# Patient Record
Sex: FEMALE | Race: OTHER | HISPANIC OR LATINO | URBAN - METROPOLITAN AREA
[De-identification: names, ages, dates, MRNs, and addresses within clinical notes are randomized per-mention and may not be internally consistent; named-entity substitution may affect disease eponyms.]

---

## 2023-08-25 ENCOUNTER — TELEPHONE (OUTPATIENT)
Dept: NEUROLOGY | Facility: CLINIC | Age: 68
End: 2023-08-25
Payer: MEDICARE

## 2023-08-25 NOTE — TELEPHONE ENCOUNTER
Called pt's son back (he is listed on chart in phone comments and his number is on chart under contacts)    Spoke to pt's son  I let him know we need her records before scheduling    He said he will contact her outside provider and get everything sent to us or will hand deliver. I told him to feel free to call us and follow up and make sure we've received the records and then we can get her scheduled  He verbalized understanding and had no further concerns or questions.

## 2023-09-12 ENCOUNTER — TELEPHONE (OUTPATIENT)
Dept: NEUROLOGY | Facility: CLINIC | Age: 68
End: 2023-09-12
Payer: MEDICARE

## 2023-09-12 NOTE — TELEPHONE ENCOUNTER
----- Message from Jessie Garcia sent at 9/12/2023 12:18 PM CDT -----  Regarding: appt access  Contact: Zhou [phone]  Pt son Zhou  is calling to speak with someone in provider office  in regards to getting pt scheduled with the provider pt stated he spoke with the [facility] this morning to check status of medical records that should have been sent over for pt Zhou is asking for a return call please call him at  [phone]

## 2023-09-12 NOTE — TELEPHONE ENCOUNTER
I received a fax from [facility] w/ pt records     I will have Dr Gatica review, then will send to be scanned to medical record

## 2023-09-13 NOTE — TELEPHONE ENCOUNTER
Called him back to ask per Dr Gatica if pt has had brain MRI past 6 mo bc we will need disc if so  He said no, not since last summer  I scheduled appt  Pt's sonverbalized understanding and had no further concerns or questions.

## 2023-09-21 ENCOUNTER — HOSPITAL ENCOUNTER (OUTPATIENT)
Dept: CARDIOLOGY | Facility: CLINIC | Age: 68
Discharge: HOME | End: 2023-09-21
Payer: MEDICARE

## 2023-09-21 ENCOUNTER — LAB VISIT (OUTPATIENT)
Dept: LAB | Facility: HOSPITAL | Age: 68
End: 2023-09-21
Attending: PSYCHIATRY & NEUROLOGY
Payer: MEDICARE

## 2023-09-21 ENCOUNTER — OFFICE VISIT (OUTPATIENT)
Dept: NEUROLOGY | Facility: CLINIC | Age: 68
End: 2023-09-21
Payer: MEDICARE

## 2023-09-21 VITALS
WEIGHT: 114.63 LBS | BODY MASS INDEX: 19.57 KG/M2 | HEART RATE: 78 BPM | HEIGHT: 64 IN | DIASTOLIC BLOOD PRESSURE: 82 MMHG | SYSTOLIC BLOOD PRESSURE: 135 MMHG

## 2023-09-21 DIAGNOSIS — G31.84 MCI (MILD COGNITIVE IMPAIRMENT): ICD-10-CM

## 2023-09-21 DIAGNOSIS — R41.3 OTHER AMNESIA: ICD-10-CM

## 2023-09-21 DIAGNOSIS — E46 PROTEIN-CALORIE MALNUTRITION, UNSPECIFIED SEVERITY: ICD-10-CM

## 2023-09-21 DIAGNOSIS — G31.84 MCI (MILD COGNITIVE IMPAIRMENT): Primary | ICD-10-CM

## 2023-09-21 LAB
ALBUMIN SERPL BCP-MCNC: 4.1 G/DL (ref 3.5–5.2)
ALP SERPL-CCNC: 62 U/L (ref 55–135)
ALT SERPL W/O P-5'-P-CCNC: 14 U/L (ref 10–44)
ANION GAP SERPL CALC-SCNC: 9 MMOL/L (ref 8–16)
AST SERPL-CCNC: 20 U/L (ref 10–40)
BASOPHILS # BLD AUTO: 0.09 K/UL (ref 0–0.2)
BASOPHILS NFR BLD: 1 % (ref 0–1.9)
BILIRUB SERPL-MCNC: 0.5 MG/DL (ref 0.1–1)
BUN SERPL-MCNC: 13 MG/DL (ref 8–23)
CALCIUM SERPL-MCNC: 9.7 MG/DL (ref 8.7–10.5)
CHLORIDE SERPL-SCNC: 103 MMOL/L (ref 95–110)
CO2 SERPL-SCNC: 26 MMOL/L (ref 23–29)
CREAT SERPL-MCNC: 0.7 MG/DL (ref 0.5–1.4)
DIFFERENTIAL METHOD: ABNORMAL
EOSINOPHIL # BLD AUTO: 0.1 K/UL (ref 0–0.5)
EOSINOPHIL NFR BLD: 1.4 % (ref 0–8)
ERYTHROCYTE [DISTWIDTH] IN BLOOD BY AUTOMATED COUNT: 13.3 % (ref 11.5–14.5)
EST. GFR  (NO RACE VARIABLE): >60 ML/MIN/1.73 M^2
FOLATE SERPL-MCNC: 9.8 NG/ML (ref 4–24)
GLUCOSE SERPL-MCNC: 82 MG/DL (ref 70–110)
HCT VFR BLD AUTO: 45.9 % (ref 37–48.5)
HGB BLD-MCNC: 14.7 G/DL (ref 12–16)
IGG SERPL-MCNC: 884 MG/DL (ref 650–1600)
IMM GRANULOCYTES # BLD AUTO: 0.03 K/UL (ref 0–0.04)
IMM GRANULOCYTES NFR BLD AUTO: 0.3 % (ref 0–0.5)
LYMPHOCYTES # BLD AUTO: 2.2 K/UL (ref 1–4.8)
LYMPHOCYTES NFR BLD: 23.5 % (ref 18–48)
MAGNESIUM SERPL-MCNC: 2.1 MG/DL (ref 1.6–2.6)
MCH RBC QN AUTO: 31.1 PG (ref 27–31)
MCHC RBC AUTO-ENTMCNC: 32 G/DL (ref 32–36)
MCV RBC AUTO: 97 FL (ref 82–98)
MONOCYTES # BLD AUTO: 0.7 K/UL (ref 0.3–1)
MONOCYTES NFR BLD: 7.3 % (ref 4–15)
NEUTROPHILS # BLD AUTO: 6.2 K/UL (ref 1.8–7.7)
NEUTROPHILS NFR BLD: 66.5 % (ref 38–73)
NRBC BLD-RTO: 0 /100 WBC
PLATELET # BLD AUTO: 396 K/UL (ref 150–450)
PMV BLD AUTO: 9.2 FL (ref 9.2–12.9)
POTASSIUM SERPL-SCNC: 3.8 MMOL/L (ref 3.5–5.1)
PROT SERPL-MCNC: 7.4 G/DL (ref 6–8.4)
RBC # BLD AUTO: 4.72 M/UL (ref 4–5.4)
SODIUM SERPL-SCNC: 138 MMOL/L (ref 136–145)
T4 SERPL-MCNC: 5.2 UG/DL (ref 4.5–11.5)
TSH SERPL DL<=0.005 MIU/L-ACNC: 2.17 UIU/ML (ref 0.4–4)
WBC # BLD AUTO: 9.36 K/UL (ref 3.9–12.7)

## 2023-09-21 PROCEDURE — 93005 EKG 12-LEAD: ICD-10-PCS | Mod: S$GLB,,, | Performed by: PSYCHIATRY & NEUROLOGY

## 2023-09-21 PROCEDURE — 0361U NEURFLMNT LT CHN DIG IA QUAN: CPT | Performed by: PSYCHIATRY & NEUROLOGY

## 2023-09-21 PROCEDURE — 3079F PR MOST RECENT DIASTOLIC BLOOD PRESSURE 80-89 MM HG: ICD-10-PCS | Mod: CPTII,S$GLB,, | Performed by: PSYCHIATRY & NEUROLOGY

## 2023-09-21 PROCEDURE — 1160F RVW MEDS BY RX/DR IN RCRD: CPT | Mod: CPTII,S$GLB,, | Performed by: PSYCHIATRY & NEUROLOGY

## 2023-09-21 PROCEDURE — 96132 NRPSYC TST EVAL PHYS/QHP 1ST: CPT | Mod: 59,S$GLB,, | Performed by: PSYCHIATRY & NEUROLOGY

## 2023-09-21 PROCEDURE — 82746 ASSAY OF FOLIC ACID SERUM: CPT | Performed by: PSYCHIATRY & NEUROLOGY

## 2023-09-21 PROCEDURE — 1126F AMNT PAIN NOTED NONE PRSNT: CPT | Mod: CPTII,S$GLB,, | Performed by: PSYCHIATRY & NEUROLOGY

## 2023-09-21 PROCEDURE — 93005 ELECTROCARDIOGRAM TRACING: CPT | Mod: S$GLB,,, | Performed by: PSYCHIATRY & NEUROLOGY

## 2023-09-21 PROCEDURE — 1101F PR PT FALLS ASSESS DOC 0-1 FALLS W/OUT INJ PAST YR: ICD-10-PCS | Mod: CPTII,S$GLB,, | Performed by: PSYCHIATRY & NEUROLOGY

## 2023-09-21 PROCEDURE — 93010 EKG 12-LEAD: ICD-10-PCS | Mod: S$GLB,,, | Performed by: INTERNAL MEDICINE

## 2023-09-21 PROCEDURE — 3075F PR MOST RECENT SYSTOLIC BLOOD PRESS GE 130-139MM HG: ICD-10-PCS | Mod: CPTII,S$GLB,, | Performed by: PSYCHIATRY & NEUROLOGY

## 2023-09-21 PROCEDURE — 83520 IMMUNOASSAY QUANT NOS NONAB: CPT | Performed by: PSYCHIATRY & NEUROLOGY

## 2023-09-21 PROCEDURE — 3008F BODY MASS INDEX DOCD: CPT | Mod: CPTII,S$GLB,, | Performed by: PSYCHIATRY & NEUROLOGY

## 2023-09-21 PROCEDURE — 1126F PR PAIN SEVERITY QUANTIFIED, NO PAIN PRESENT: ICD-10-PCS | Mod: CPTII,S$GLB,, | Performed by: PSYCHIATRY & NEUROLOGY

## 2023-09-21 PROCEDURE — 82607 VITAMIN B-12: CPT | Performed by: PSYCHIATRY & NEUROLOGY

## 2023-09-21 PROCEDURE — 96116 NUBHVL XM PHYS/QHP 1ST HR: CPT | Mod: 59,S$GLB,, | Performed by: PSYCHIATRY & NEUROLOGY

## 2023-09-21 PROCEDURE — 1101F PT FALLS ASSESS-DOCD LE1/YR: CPT | Mod: CPTII,S$GLB,, | Performed by: PSYCHIATRY & NEUROLOGY

## 2023-09-21 PROCEDURE — 3008F PR BODY MASS INDEX (BMI) DOCUMENTED: ICD-10-PCS | Mod: CPTII,S$GLB,, | Performed by: PSYCHIATRY & NEUROLOGY

## 2023-09-21 PROCEDURE — 3075F SYST BP GE 130 - 139MM HG: CPT | Mod: CPTII,S$GLB,, | Performed by: PSYCHIATRY & NEUROLOGY

## 2023-09-21 PROCEDURE — 84425 ASSAY OF VITAMIN B-1: CPT | Performed by: PSYCHIATRY & NEUROLOGY

## 2023-09-21 PROCEDURE — 84436 ASSAY OF TOTAL THYROXINE: CPT | Performed by: PSYCHIATRY & NEUROLOGY

## 2023-09-21 PROCEDURE — 3288F PR FALLS RISK ASSESSMENT DOCUMENTED: ICD-10-PCS | Mod: CPTII,S$GLB,, | Performed by: PSYCHIATRY & NEUROLOGY

## 2023-09-21 PROCEDURE — 93010 ELECTROCARDIOGRAM REPORT: CPT | Mod: S$GLB,,, | Performed by: INTERNAL MEDICINE

## 2023-09-21 PROCEDURE — 83735 ASSAY OF MAGNESIUM: CPT | Performed by: PSYCHIATRY & NEUROLOGY

## 2023-09-21 PROCEDURE — 86780 TREPONEMA PALLIDUM: CPT | Performed by: PSYCHIATRY & NEUROLOGY

## 2023-09-21 PROCEDURE — 83921 ORGANIC ACID SINGLE QUANT: CPT | Mod: 91 | Performed by: PSYCHIATRY & NEUROLOGY

## 2023-09-21 PROCEDURE — 1159F PR MEDICATION LIST DOCUMENTED IN MEDICAL RECORD: ICD-10-PCS | Mod: CPTII,S$GLB,, | Performed by: PSYCHIATRY & NEUROLOGY

## 2023-09-21 PROCEDURE — 96132 PR NEUROPSYCHOLOGIC TEST EVAL SVCS, 1ST HR: ICD-10-PCS | Mod: 59,S$GLB,, | Performed by: PSYCHIATRY & NEUROLOGY

## 2023-09-21 PROCEDURE — 0346U AD DETECT, BETA-AMYLOID 42/40 RATIO: CPT | Performed by: PSYCHIATRY & NEUROLOGY

## 2023-09-21 PROCEDURE — 1159F MED LIST DOCD IN RCRD: CPT | Mod: CPTII,S$GLB,, | Performed by: PSYCHIATRY & NEUROLOGY

## 2023-09-21 PROCEDURE — 99205 PR OFFICE/OUTPT VISIT, NEW, LEVL V, 60-74 MIN: ICD-10-PCS | Mod: S$GLB,,, | Performed by: PSYCHIATRY & NEUROLOGY

## 2023-09-21 PROCEDURE — 99205 OFFICE O/P NEW HI 60 MIN: CPT | Mod: S$GLB,,, | Performed by: PSYCHIATRY & NEUROLOGY

## 2023-09-21 PROCEDURE — 99999 PR PBB SHADOW E&M-EST. PATIENT-LVL II: CPT | Mod: PBBFAC,,, | Performed by: PSYCHIATRY & NEUROLOGY

## 2023-09-21 PROCEDURE — 3079F DIAST BP 80-89 MM HG: CPT | Mod: CPTII,S$GLB,, | Performed by: PSYCHIATRY & NEUROLOGY

## 2023-09-21 PROCEDURE — 82784 ASSAY IGA/IGD/IGG/IGM EACH: CPT | Performed by: PSYCHIATRY & NEUROLOGY

## 2023-09-21 PROCEDURE — 3288F FALL RISK ASSESSMENT DOCD: CPT | Mod: CPTII,S$GLB,, | Performed by: PSYCHIATRY & NEUROLOGY

## 2023-09-21 PROCEDURE — 85025 COMPLETE CBC W/AUTO DIFF WBC: CPT | Performed by: PSYCHIATRY & NEUROLOGY

## 2023-09-21 PROCEDURE — 1160F PR REVIEW ALL MEDS BY PRESCRIBER/CLIN PHARMACIST DOCUMENTED: ICD-10-PCS | Mod: CPTII,S$GLB,, | Performed by: PSYCHIATRY & NEUROLOGY

## 2023-09-21 PROCEDURE — 99999 PR PBB SHADOW E&M-EST. PATIENT-LVL II: ICD-10-PCS | Mod: PBBFAC,,, | Performed by: PSYCHIATRY & NEUROLOGY

## 2023-09-21 PROCEDURE — 96116 PR NEUROBEHAVIORAL STATUS EXAM BY PSYCH/PHYS: ICD-10-PCS | Mod: 59,S$GLB,, | Performed by: PSYCHIATRY & NEUROLOGY

## 2023-09-21 PROCEDURE — 80053 COMPREHEN METABOLIC PANEL: CPT | Performed by: PSYCHIATRY & NEUROLOGY

## 2023-09-21 PROCEDURE — 84443 ASSAY THYROID STIM HORMONE: CPT | Performed by: PSYCHIATRY & NEUROLOGY

## 2023-09-21 NOTE — PROGRESS NOTES
[facility]  [facility]     PATIENT: Abbey Hendrickson  VISIT DATE: 2023  MRN: [id number]  PRIMARY PROVIDER: Radha Primary Doctor  : 1955       Chief complaint: Progressive Cognitive Impairment     History of present illness:      The patient is a 68-year-old right-handed female who presents today to the [facility] due to concerns related to Progressive Cognitive Impairment.  The patient is accompanied by the son who participates in providing history.  Additional information is obtained by reviewing available medical records.     Relevant Background/Context  Known Relevant Family history:  Mother -  at age 80  Father - N/A  Neurocognitive Disorder:  Mother - LOAD onset early 70s  80s maybe VAD  Movement Disorder:  Maternal great aunt - always in crutches  Motorneuron Disorder:  The patient/family denies a history of ALS, MND, PLS.  Developmental Disorder:  The patient/family denies a history of Dyslexia, ADHD, ASD.  Psychiatric Disorder:  The patient/family denies a history of MDD, BD, SHAI, Schizophrenia.  Known Relevant Genetics:  There is no relevant genetic biomarkers available on record.  Developmental Milestones:  The patient/family report no known birth complications or early life problems. The patient met all developmental milestones.  Education/Learning Capacity:  The patient/family report no signs or symptoms suggestive of developmental learning disorder.  HS  BA. + 4 years [employer]/Psychology  Estimated Educational Experience: 12 years of formal education.  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 Born in the [place], the patient experienced numerous relocations during her early childhood, eventually settling in [place] from the age of 14. She denies any relevant childhood medical or traumatic history or exposure to toxins/malnutrition. The patient maintained a normal state of health until 2019, when her family first noticed an escalation in her baseline anxiety. It is unclear if a psychiatric management plan was established at this time, and Lexapro is the only known psychiatric medication she has historically been on. The patient cannot recall whether she experienced any clinical benefits from Lexapro. In 2019, her family reported a rise in anxiety levels and noted worsening short-term memory loss and forgetfulness, prompting a neurological evaluation in [place], with an MRI of the brain being completed. However, the records from these encounters are not available for secondary review, and no further workup was conducted. Due to escalating concerns regarding cognitive impairment, the patient was further evaluated at the [facility], and a neuropsychologist reportedly diagnosed her with Mild Cognitive Impairment (MCI) in early 2020. Again, these records are not available for secondary review. Over the subsequent two years, concerns grew regarding the patients isolation and increased alcohol intake, coupled with a loss in weight and deteriorating nutrition. The patient also experienced increasing depression and anxiety, reportedly from a deteriorating relationship, and was reportedly told she had progressed to dementia in July 2022 by a neuropsychologist. Consequently, she moved to [place] to live with her son in November 2022, and since the relocation, her family reports relative stability in her  symptoms, with acute amnesia being the notable exception. 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 Since then, the patient is no longer responsible for handling large sums of money, taxes, or finances but remains fully independent in terms of chores, meal preparation, shopping, and handling small amounts of money. Upon presentation, the patient is described as pleasant and appropriate, with no cortical deficits noted, but displaying severe acute amnesia with significant lesion. This clinical presentation raises concerns for conditions such as Wernicke's Korsakoff's, and late-onset Alzheimer's disease cannot be ruled out, suggesting a likely mixed pathology given the reported clinical history. It is recommended to undertake additional diagnostic workups to definitively rule out conditions such as Alzheimer's disease and to screen for reversible causes of cognitive impairment. The family has requested a more comprehensive evaluation due to previously limited diagnoses by other providers.  Unresolved Concern(s) reported by patient/family:  PREV lexapro  PREV donepezil, namenda     Review of cognitive, visuospatial, motor, sensory, and behavioral systems:     Memory:   The patient's memory has worsened in the past few years.  She does repeat statements or asks the same question repeatedly.  She does have difficulty remembering recent important conversations.  She does have difficulty remembering recent events. Comment: needs prompting  She does not forget information within minutes.  Her recent retrograde memory is intact.  Her remote memory is intact.  Attention:   The patient's attention and concentration are impaired.  She does not have attentional fluctuations.  She does have difficulty with selective attention.  She does become easily distracted.  She does have difficulty  with divided attention.  Executive:   The patient's cognitive processing speed is slower.  She does have difficulty with working memory.  She does misplace personal items (e.g., keys, cell phone, wallet) more frequently.  She does have difficulty keeping track of her medications.  She does have difficulty with planning/organizing/completing multistep tasks.  She does have not difficulty with executive attention.  She does have difficulty with flexible thinking.  She does not have difficulty with response inhibition.  She denies new impulsivity or rash/careless actions.  Her judgment is intact.  Language:   The patient's speech is affected.  She does forget people's names more frequently.  She does not have word-finding difficulties.  Her speech is fluent and non-effortful.  Her speech is grammatically intact.  She does not make word substitutions.  She does not have difficulty reading.  She does not appear to have impaired comprehension.  Visuospatial:   The patient has new visuospatial problems.  She has become confused or disoriented in *new*, unfamiliar places.  She does have trouble navigating.  She has gotten lost in familiar places.  She does not have visuospatial disorientation.  She does not have difficulty recognizing objects or faces.  She denies problems with driving or parking.  Motor/Coordination:   The patient does have difficulty with walking.  She does not feel imbalanced.  She denies having fallen.  She does not appear to have new muscle weakness.  She does not have difficulty buttoning shirts, operating zippers, or manipulating tools/utensils.  Her handwriting has not become micrographic.  She does not have a resting tremor.  She denies having any new involuntary movements and/or muscle jerking.  She does not have swallowing difficulty.  She denies new muscle cramps and twitching.  Sensory:   The patient reports a new sensory disturbance described as numbness, tingling, paresthesias, and/or  pain.  The patient denies a loss of vision, blurry vision, or double vision.  The patient denies new loss of hearing or worsening tinnitus.  The patient denies anosmia.  Sleep:   The patient reports difficulty sleeping.  The patient does have difficulty going to sleep.  The patient denies difficulty staying asleep or frequently awakening at night.  The patient does snore and/or have witnessed apneas while sleeping. Comment: very mild snoring  When she wakes up in the morning, she does feel well-rested.  She denies dream-enactment behavior.  She denies symptoms suggestive of restless leg syndrome.  Behavior:   The patient's personality has not changed.  She does not have symptoms of disinhibition and social inappropriateness.  She does not have symptoms to suggest a loss of manners or decorum.  She does not have apathy and/or decreased motivation.  She does not appear to have a change in inertia.  The patient's emotional expression has changed.  She does have emotional blunting or lability.  She does not have symptoms of irritability and mood lability.  She does not have symptoms of agitation, aggression, or violent outbursts.  Her insight into his disease and situation is impaired.  Her personal hygiene has become impaired.  She is not exhibiting a diminished response to other people's needs and feelings.  She is not exhibiting a diminished social interest, interrelatedness, or personal warmth.  She denies restlessness.  She denies new and/or worsening simple repetitive behaviors.  Her speech has not become simplified or become repetitive/stereotyped.  She reports symptoms that are suggestive of new/worsening complex repetitive/ritualistic compulsions and behaviors.  She does not have symptoms of hyper-religiosity or dogmatism.  Her interests/pleasures have not become restrictive, simplified, interrupting, or repetitive.  She denies a change of self-stimulating behavior.  She has had changes in eating behavior.  She  denies increased consumption of food or substances.  She denies oral exploration or consumption of inedible objects.  Psychiatric:   She does not feel depressed.  She is exhibiting symptoms of social withdrawal/indifference.  She does have anxiety.  She does not exhibit cycling behavior.  She does not exhibit hyperactive behavior.  She is not exhibited symptoms of paranoia.  She does not have delusions.  She does not have hallucinations.  She does not have a history of sensitivity to neuroleptic/psychotropic medications.  Medical Review of Systems:   The patient does not have constipation.  The patient does not have urinary incontinence.  The patient denies orthostatic lightheadedness.  The patient's weight is stable.  Functional status:  Difficulty performing the following Instrumental ADLs:  Housekeeping: No  Food Preparation: No  Shopping: No  Ability to Handle Finances: Yes  Transportation/Driving: No  Household Appliances/Stove: No  Laundry: No  Difficulty performing the following Basic ADLs:  Dressing: No  Bathing: No  Toileting: No  Personal hygiene and grooming: No  Feeding: No  Care Management:  Patient/Family Safety Concerns:  Medication Adherence: No  Home Safety: No  Wandered: No  Firearms: No  Fall Risk: No  Home Alone: No            History reviewed. No pertinent past medical history.    History reviewed. No pertinent surgical history.    History reviewed. No pertinent family history.    Social History     Socioeconomic History    Marital status: Unknown       Medication:     No current outpatient medications on file prior to visit.     No current facility-administered medications on file prior to visit.        Review of patient's allergies indicates:  No Known Allergies    Medications Reconciliation:   I have reconciled the patient's home medications and discharge medications with the patient/family. I have updated all changes.  Refer to After-Visit Medication List.    Objective:  Vital  Signs:  Vitals:    09/21/23 1045   BP: 135/82   Pulse: 78     Wt Readings from Last 3 Encounters:   09/21/23 1045 52 kg (114 lb 10.2 oz)     Body mass index is 19.68 kg/m².           Neurological examination:  Mental Status:   Her appearance deviates from typical expectations given their age and context. Comment: looks older than stated age;  Throughout the interview, she is cooperative, her eye contact is appropriate.  Her behavior is appropriate to the clinical context without impropriety or improper language/conduct.  Her behavior was not characterized by episodes of sudden uncontrollable and inappropriate laughing or crying.  The patient is awake; Her energy level appeared normal.  Her orientation is normal; Spatial 5/5 (location, the floor of building, city, county, state) and temporal 5/5 (month, day, year, MOJGAN) dimensions are accurate.  She has appropriate attention/concentration (DARYA/MOJGAN forwards and backward).  She can complete three-step commands.  Her fund of knowledge was appropriate for age, culture, and level of education.  Her thought process is not logical or goal-oriented. Comment: circumstantial;  She demonstrated impaired insight based on actions, awareness of her illness, plans for the future.  She demonstrated good judgment based on actions and plans for the future.  She has no evidence of hallucinations (auditory, visual, olfactory).  She has no evidence of delusions (paranoid, grandiose, bizarre).  Cranial Nerves:   Her pupils were normal.  Her visual fields were full to confrontation in all quadrants.  Her ocular pursuit in the horizontal and vertical plane was complete.  Her saccadic initiation, velocity, and amplitude are normal.  Her facial strength was normal.  Her facial expression was symmetric and appropriate to the context.  Her hearing was diminished.  Her tongue showed no evidence of scalloping.  She tongue movement with normal.  She had no significant evidence of anterocollis or  retrocollis.  Speech/Language:   The patient's speech was fluent, non-effortful, and her rate was appropriate to the context.  She has no articulation (segmental features) errors.  The patient's speech is not dysarthric.  The patient's speech was without evidence of anomia.  She makes no phonological loop errors.  She can comprehend commands that cross the midline (e.g., with your left thumb, touch your right ear).  She can comprehend syntactically complex sentences.  Motor:   The patient's bilateral upper extremity muscle bulk is appropriate.  The patient's upper extremity muscle tone is increased.  There is evidence of rigidity/cogwheeling. Comment: B/L, R>L, Mild; Muscle tone is increased and there is evidence of rigidity/cogwheeling.  Assessment of motor strength was symmetric and at minimal anti-gravity.  There is no pronator or downward drift.  There is no myoclonus observed in The patient's bilateral upper and lower extremities.  There are no fasciculations observed in The patient's bilateral upper and lower extremities.  Coordination:   She has no bilateral upper extremity limb dysmetria or past pointing on finger-nose-finger bilaterally.  She has no limb dysdiadochokinesia of the upper extremity on the pronation/supination test and screwing in a light bulb or lower extremity during tapping ball of each foot bilaterally.  She has no visible tremor.  She has no evidence of interhemispheric motor control deficits.  She has no motor overflow bilaterally.  The patient's upper extremity fine motor coordination was abnormal. Comment: B/L, R>L, Mild;  The patient's upper extremity fine motor coordination was not slow. Comment: finger tapping, pronation/supination, and the open-close fist was slow.; finger tapping, pronation/supination, and the open-close fist was slow.  The patient's upper extremity fine motor coordination was not hypometric. Comment: finger tapping, pronation/supination, and the open-close fist  showed hypometria.; finger tapping, pronation/supination, and the open-close fist showed hypometria.  The patient's upper extremity fine motor coordination was not dysrhythmic. Comment: finger tapping, pronation/supination, and the open-close fist showed dysrhythmia.; finger tapping, pronation/supination, and the open-close fist showed dysrhythmia.  Higher Cortical Function:   The patient showed no evidence of simultanagnosia (Navon hierarchical letters).  The patient showed no evidence of visuospatial constructional dysfunction.  The patient showed no evidence of apraxia.  She showed no dysexecutive behavior.  Sensory:   Her cortical sensory assessment demonstrated no neglect bilaterally.  Her sensation was intact to light touch, and vibratory sense in the bilateral upper and lower extremities.  Reflexes:   Reflexes were symmetric and 2+ at biceps, 2+ triceps, and 2+ brachioradialis, 2+ at the knees bilaterally, there was no cross-abductor sign, 2+ in the bilateral ankles.  Gait:   She can arise from a chair and sit back down without using their arms.  Her gait was normal.  When attempting to walk abnormally (heels, tiptoes, tandem), she makes no errors.  She has no evidence of a specific gait disorder.  Neuropsychological Evaluation Summary:  Prior Neurocognitive/Neurobehavioral Evaluation(s)  No Prior Testing Available  Neurocognitive/Neurobehavioral Evaluation completed on 2023-09-21    Memory    Registration-3 2/3 Impairment: Moderate.   Recall-3 0/3 Impairment: Significant.   Recall-5 0/5 Impairment: Significant.   Registration-5 2/0     T1 2/9 Impairment: -3 STDs below the average score based on age and education.   T2 5/9 Impairment: -3 STDs below the average score based on age and education.   T3 4/9 Impairment: -4.8 STDs below the average score based on age and education.   T4 6/9 Impairment: -3.1 STDs below the average score based on age and education.   T5 5/9 Impairment: -4.4 STDs below the average  score based on age and education.   DR-30 Sec 2/9 Impairment: -4.4 STDs below the average score based on age and education.   DR-Cued 2/9 Impairment: -5.1 STDs below the average score based on age and education.   Recognition 9/9 Within Normal Limits.   Executive    Three-step command 3/3 Within Normal Limits.   Trials-1 1/1 Within Normal Limits.   WORLD Backward 5/5 Within Normal Limits.   Digit Span - 2 2/2 Within Normal Limits.   Serial Sevens 2/3 Impairment: Moderate.   Fluency 1/1 Within Normal Limits.   Digit Span Backwards 5 Within Normal Limits.   Lexical Fluency - F 16 Within Normal Limits.   Lexical Fluency - A 15 Within Normal Limits.   Lexical Fluency - S 17 Within Normal Limits.   Semantic Fluency - Animals 20 Within Normal Limits.   Visuospatial    Intersecting Pentagons 1/1 Within Normal Limits.   3D Cube Copy 0/1 Impairment: Significant.   Clock Draw 3/3 Within Normal Limits.   Barkley Copy 14/17 Impairment: Mild to Normal.   Overlapping Images - Update 12/12 Within Normal Limits.   Picture Synthesis 3/3 Within Normal Limits.   Emotion Perception 5/5 Within Normal Limits.   Calculations 5/5 Within Normal Limits.   Noise Pareidolia Test 5/5 Within Normal Limits.   Attention    Orientation-10 5/10 Impairment: Moderate.   Orientation-6 3/6 Impairment: Moderate.   Alternating Sequence 1/1 Within Normal Limits.   Digit Span Forwards 7 Within Normal Limits.   Language    Repetition-1 1/1 Within Normal Limits.   Naming-2 2/2 Within Normal Limits.   Following written command 1/1 Within Normal Limits.   Writing a complete sentence 1/1 Within Normal Limits.   Naming-3 3/3 Within Normal Limits.   Repetition-2 2/2 Within Normal Limits.   Abstraction 2/2 Within Normal Limits.   15-Item BNT 15/15 Within Normal Limits.   Repetition of Phrases 5/5 Within Normal Limits.   Verbal Agility 6/6 Within Normal Limits.   SYDBAT - Semantic Association 30/30 Within Normal Limits.   Repeat & Point - Nonfluent 10/10 Within  Normal Limits.   Repeat & Point - Semantics 10/10 Within Normal Limits.   Neurocognitive Focused Evaluation Aggregate Score(s)    MMSE 21/30 MMSE Score suggestive of mild cognitive impairment.   MOCA 20/30 MOCA Score suggestive of mild cognitive impairment.   Neuropsychiatric/Behavioral Focused Evaluation Assessment    BEHAV5+ 2/6 See ROS section for a full description           Neuroimaging:  No brain imaging is currently available for review.     Procedures:  No behavioral neurology relevant procedures are currently available for review.     Clinical Summary:     The patient is a 68-year-old right-handed female without a relevant past medical history who presents reporting a 4-year history of gradually progressive neurocognitive impairment.       The clinical history is suggestive of:  Memory Impairment: STM encoding impairment, LTM encoding-retrieval impairment  Attention Impairment: Attention, Selective attention, Sustained attention, Shifting attention  Executive Impairment: Energization, Working Memory  Language Impairment: Language Dysfunction  Visuospatial Impairment: Allocentric Spatial Processing  Motor/Coordination Impairment: Sensory motor integration  Behavior Impairment: Neurovegetative, Emotional Regulation, Self-Preservation Dysregulation, Sensorimotor Dysregulation, Stimulation Dysregulation  Psychiatric Impairment: Social Coherence, Signal-Noise Dysregulation  iADL Impairment: [place] Instrumental Activities of Daily Living Scale  The neurological examination is significant for:  Executive Impairment: thought disorder  Movement Disorder (Hypokinetic): parkinsonism (tone, bradykinesia), dyskinesia (slowing, hypometria, dysrhythmia)  Sensory Dysfunction: hearing (diminished)  The neurocognitive battery is significant (based on age and education) for:  Amnestic predominant mild cognitive impairment  Severe  Memory Impairment: encoding, recall, attention.  MMSE 21/30: MMSE Score suggestive of mild  cognitive impairment.  MOCA 20/30: MOCA Score suggestive of mild cognitive impairment.  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     The patient's clinical presentation meets the criteria for Mild Cognitive Impairment (MCI-ADRC) (Juan Carlos MS, et al. 2011 Alzheimer's & Dementia).     Concern regarding an intraindividual change in cognition  Impairment in one or more cognitive domains  Preservation of independence in functional abilities.  Not demented     The patient's clinical presentation technically meets criteria for early stages of Late-Onset Alzheimer's Dementia (LOAD) (Lyndsey et al. 2011)  Meets criteria for dementia.  Insidious onset over months to years.  Clear-cut history of worsening cognition by report or observation.  Amnestic presentation: impairment in learning and recall.        However, given the severity of focal amnesia with relative preservation of other domains without any cortical findings on examination suggests there may be some degree of and Wernicke-Korsakoff Syndrome Spectrum (WKS).  At present, all neurodegenerative diseases can only be diagnosed with 100% certainty through a brain autopsy. The suspected neuropathology underlying the patient's neurocognitive impairment is likely a mixture of pathologies (Alzheimer's Disease Related Pathology, Vascular Contributions to Cognitive Impairment and Dementia, a metabolic/vitamin deficiency).  There are no plasma protein biomarkers available on record.  There are no CSF protein biomarkers available on record.  There are no dermatological protein biomarkers available on  record.  There is no relevant genetic biomarkers available on record.       Patient presents reporting a at minimum 4 year history of gradual progressive neurocognitive impairment with borderline interference in instrumental activities of daily living consistent prodrome of dementia.  Clinical presentation is consistent with severe largely focal amnestic cognitive impairment with preservation of all the all the all other domains without any focal cortical signs collectively concerning for Wernicke's Korsakoff's syndrome or LATE.  Recommend ruling out Alzheimer's disease.     The observations made above, were discussed with the patient and their supporting historian(s) (son). We have discussed the additional diagnostic(s) and/or managenent below.     Care Management Plan:    #Diagnostic Screening for reversible forms of neurocognitive disorders  We recommend screening for reversible causes of neurocognitive impairment with plasma laboratories  We have ordered plasma CBC, CMP, Vitamins (B1, B9, B12), Mg, RPR, MMA, TSH, T4, Nfl  We recommend screening for anatomical CNS lesions/neurodegenerative patterns  We have ordered an MRI brain without contrast - Dementia Protocol  #Diagnostic Screening for cardiac conduction impairment before initiation of acetylcholinesterase inhibitor medication.  We have placed an order for a echocardiogram  #Diagnostic Screening for measurable forms of neurodegenerative pathology.  We have discussed opportunities for biomarker testing ([facility] biomarkers, [facility]As Amyloid-PET, Syn-One skin biopsy).  #Optimize Neurocognitive Impairment and Quality  We have discussed the MIND Diet and other lifestyle behavior that may help maintain brain health.  We have provided written/digital reading material  #Optimize Behavioral Management and Quality.  No indication for memantine at this time  #Optimize Sleep Hygiene and Quality  We discussed and recommended additional diagnostic/management of sleep  "disorder to optimize brain health and longevity.  We have placed referrals to sleep medicine due to signs and symptoms suggestive of sleep apnea.  #Behavioral/Environmental Strategies  We recommend engaging in activities that stimulate cognitively and socially while avoiding excessive stimulation and fatigue in overwhelmingly complex situations.  We recommend integrating routine and schedule into your daily life. https://www.alzheimersproject.org/news/the-importance-of-routine-and-familiarity-to-persons-with-dementia/  #Health Maintenance/Lifestyle Advice  We have discussed the value in aggressively controlling vascular risk factors like hypertension, hyperlipidemia, and Diabetes SBP<130, LDL<100, A1C<7.0.  We discussed the need to optimize lifestyle choices including a heart-healthy diet (e.g., Mediterranean or DASH), increased cardiovascular exercise (goal 150 minutes of moderate-intensity per week), and stay cognitively and socially active.  We recommend the MIND diet, a combination of two healthy diets: the Mediterranean diet and the DASH (Dietary Approaches to Stop Hypertension) diet, and includes a variety of brain-friendly foods to optimize cognitive health and longevity.  #Support  We all need support sometimes. Get easy access to local resources, community programs, and services. https://www.communityresourcefinder.org/  Learn more about Cognitive Impairment in [place]: https://www.alz.org/professionals/public-health/state-overview[place]  #Safety  The [employer] administers the nationwide "Safe Return" program with identification bracelets, necklaces, or clothing tags and 24-hour assistance. More information is available online at https://www.alz.org/help-support/caregiving/safety/medicalert-with-24-7-wandering-support  #Follow up:  Follow-up in 4 weeks (Oct 2023).    Thank you for allowing us to participate in the care of your patient. Please do not hesitate to contact us with any " questions or concerns.     It was a pleasure seeing The patient and we look forward to seeing them at their follow-up visit.     This note is dictated on M*Modal Fluency Direct word recognition program. There are word recognition mistakes that are occasionally missed on review.         Scheduled Follow-up :  Future Appointments   Date Time Provider Department Center   9/21/2023 12:30 PM LAB, APPOINTMENT [facility]       After Visit Medication List :     Medication List      as of September 21, 2023 11:46 AM     You have not been prescribed any medications.         Signing Physician:  Daniel Gatica MD    Billing:        -----------------------------------------------------------------------------    I spent a total of 90 minutes (time-in: 11:00 AM; time-out: 12:30 PM) on 2023-09-21, in person face-to-face with the patient and caregiver(s), >50% of that time was spent counseling regarding the symptoms, treatment plan, risks, therapeutic options, lifestyle modifications, and/or safety issues for the diagnoses above.    10/14 Review of Systems completed and is negative except as stated above in HPI (Systems reviewed: Const, Eyes, ENT, Resp, CV, GI, , MSK, Skin, Neuro)    I performed a neurobehavioral status examination that included a clinical assessment of thinking, reasoning, and judgment. Please see above HPI and ROS for full details. This exam was performed on 2023-09-21 and included 14 minutes spent on direct face-to-face clinical observation and interview with the patient and 18 minutes spent interpreting test results and preparing the report. The total time of 32 minutes spent on the neurobehavioral status examination is not included in the time spent on evaluation and management coding.    I performed a neuropsychological evaluation that included the application of a series of standardized neurocognitive tests. Please see the informal neuropsychological assessment above for full  details. This evaluation was performed on 2023-09-21 and included 13 minutes spent on direct face-to-face clinical standardized test administration with the patient and 19 minutes spent on interpreting standardized test results, integrating patient data into a treatment plan, and providing feedback to the patient and caregiver. The total time of 32 minutes spent on the neuropsychological evaluation is not included in the time spent on evaluation and management coding.    Total Billing time spent on encounter/documentation for this patient's evaluation and management, not including the neurobehavioral status examination and neuropsychological evaluation: 63 minutes.

## 2023-09-22 LAB — VIT B12 SERPL-MCNC: 280 NG/L (ref 180–914)

## 2023-09-23 ENCOUNTER — HOSPITAL ENCOUNTER (OUTPATIENT)
Dept: RADIOLOGY | Facility: OTHER | Age: 68
Discharge: HOME | End: 2023-09-23
Attending: PSYCHIATRY & NEUROLOGY
Payer: MEDICARE

## 2023-09-23 DIAGNOSIS — R41.3 OTHER AMNESIA: ICD-10-CM

## 2023-09-23 PROCEDURE — 70551 MRI BRAIN STEM W/O DYE: CPT | Mod: 26,,, | Performed by: RADIOLOGY

## 2023-09-23 PROCEDURE — 70551 MRI BRAIN STEM W/O DYE: CPT | Mod: TC

## 2023-09-23 PROCEDURE — 70551 MRI BRAIN WITHOUT CONTRAST: ICD-10-PCS | Mod: 26,,, | Performed by: RADIOLOGY

## 2023-09-25 LAB — NEUROFILAMENT LIGHT CHAIN, PLASMA: 16.2 PG/ML

## 2023-09-26 ENCOUNTER — TELEPHONE (OUTPATIENT)
Dept: NEUROLOGY | Facility: CLINIC | Age: 68
End: 2023-09-26
Payer: MEDICARE

## 2023-09-26 LAB
METHYLMALONATE SERPL-SCNC: 0.19 NMOL/ML
METHYLMALONATE SERPL-SCNC: 0.21 UMOL/L
PHOSPHO-TAU (181P): 0.78 PG/ML (ref 0–0.97)
TREPONEMA PALLIDUM IGG+IGM AB [PRESENCE] IN SERUM OR PLASMA BY IMMUNOASSAY: NONREACTIVE

## 2023-09-26 NOTE — TELEPHONE ENCOUNTER
----- Message from Daniel Gatica MD sent at 9/24/2023  8:43 PM CDT -----  Hi,  Please schedule the patient for a video follow-up to discuss test results and care management.  Daniel Ordonez

## 2023-09-27 LAB
ABETA 42/40 RATIO: <0.227
AL BETA40 PLAS-MCNC: 110 PG/ML
AL BETA42 PLAS-MCNC: <25 PG/ML
VIT B1 BLD-MCNC: 69 UG/L (ref 38–122)

## 2023-11-14 ENCOUNTER — OFFICE VISIT (OUTPATIENT)
Dept: NEUROLOGY | Facility: CLINIC | Age: 68
End: 2023-11-14
Payer: MEDICARE

## 2023-11-14 DIAGNOSIS — E51.9 THIAMINE DEFICIENCY: ICD-10-CM

## 2023-11-14 DIAGNOSIS — E53.8 B12 DEFICIENCY: ICD-10-CM

## 2023-11-14 DIAGNOSIS — F04 WERNICKE-KORSAKOFF SYNDROME: ICD-10-CM

## 2023-11-14 DIAGNOSIS — G31.9 BRAIN ATROPHY: ICD-10-CM

## 2023-11-14 DIAGNOSIS — G31.84 MCI (MILD COGNITIVE IMPAIRMENT): Primary | ICD-10-CM

## 2023-11-14 DIAGNOSIS — F10.10 ALCOHOL ABUSE: ICD-10-CM

## 2023-11-14 PROCEDURE — 99215 OFFICE O/P EST HI 40 MIN: CPT | Mod: 25,95,, | Performed by: PSYCHIATRY & NEUROLOGY

## 2023-11-14 PROCEDURE — 1159F MED LIST DOCD IN RCRD: CPT | Mod: CPTII,95,, | Performed by: PSYCHIATRY & NEUROLOGY

## 2023-11-14 PROCEDURE — 96116 PR NEUROBEHAVIORAL STATUS EXAM BY PSYCH/PHYS: ICD-10-PCS | Mod: 59,95,, | Performed by: PSYCHIATRY & NEUROLOGY

## 2023-11-14 PROCEDURE — 1159F PR MEDICATION LIST DOCUMENTED IN MEDICAL RECORD: ICD-10-PCS | Mod: CPTII,95,, | Performed by: PSYCHIATRY & NEUROLOGY

## 2023-11-14 PROCEDURE — 1160F RVW MEDS BY RX/DR IN RCRD: CPT | Mod: CPTII,95,, | Performed by: PSYCHIATRY & NEUROLOGY

## 2023-11-14 PROCEDURE — 1160F PR REVIEW ALL MEDS BY PRESCRIBER/CLIN PHARMACIST DOCUMENTED: ICD-10-PCS | Mod: CPTII,95,, | Performed by: PSYCHIATRY & NEUROLOGY

## 2023-11-14 PROCEDURE — 96116 NUBHVL XM PHYS/QHP 1ST HR: CPT | Mod: 59,95,, | Performed by: PSYCHIATRY & NEUROLOGY

## 2023-11-14 PROCEDURE — 99215 PR OFFICE/OUTPT VISIT, EST, LEVL V, 40-54 MIN: ICD-10-PCS | Mod: 25,95,, | Performed by: PSYCHIATRY & NEUROLOGY

## 2023-11-14 RX ORDER — ACETAMINOPHEN, DIPHENHYDRAMINE HCL, PHENYLEPHRINE HCL 325; 25; 5 MG/1; MG/1; MG/1
TABLET ORAL
Qty: 30 TABLET | Refills: 3 | Status: SHIPPED | OUTPATIENT
Start: 2023-11-14

## 2023-11-14 RX ORDER — THIAMINE HCL 250 MG
250 TABLET ORAL DAILY
Qty: 30 TABLET | Refills: 5 | Status: SHIPPED | OUTPATIENT
Start: 2023-11-14

## 2023-11-14 NOTE — PROGRESS NOTES
[facility]     PATIENT: Abbey Hendrickson  VISIT DATE: 2023  MRN: [id number]  PRIMARY PROVIDER: Radha Primary Doctor  : 1955       Chief complaint: Progressive Cognitive Impairment     History of present illness:      The patient is a 68-year-old right-handed female who presents today to the [facility] due to concerns related to Progressive Cognitive Impairment.  The patient is accompanied by the son who participates in providing history.  Additional information is obtained by reviewing available medical records.     Relevant Background/Context  Known Relevant Family history:  Mother -  at age 80  Father - N/A  Neurocognitive Disorder:  Mother - LOAD onset early 70s  80s maybe VAD  Movement Disorder:  Maternal great aunt - always in crutches  Motorneuron Disorder:  The patient/family denies a history of ALS, MND, PLS.  Developmental Disorder:  The patient/family denies a history of Dyslexia, ADHD, ASD.  Psychiatric Disorder:  The patient/family denies a history of MDD, BD, SHAI, Schizophrenia.  Known Relevant Genetics:  There is no relevant genetic biomarkers available on record.  Developmental Milestones:  The patient/family report no known birth complications or early life problems. The patient met all developmental milestones.  Education/Learning Capacity:  The patient/family report no signs or symptoms suggestive of developmental learning disorder.  HS  BA. + 4 years [employer]/Psychology  Estimated Educational Experience: 12 years of formal education.  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  Neurologist (09/21/2023)  Type: Chart Review. The patient is presented with her son, who serves as the primary historian, and additional history is assembled from a review of previous medical records. Born in the [place], the patient experienced numerous relocations during her early childhood, eventually settling in [place] from the age of 14. She denies any relevant childhood medical or traumatic history or exposure to toxins/malnutrition. The patient maintained a normal state of health until 2019, when her family first noticed an escalation in her baseline anxiety. It is unclear if a psychiatric management plan was established at this time, and Lexapro is the only known psychiatric medication she has historically been on. The patient cannot recall whether she experienced any clinical benefits from Lexapro. In 2019, her family reported a rise in anxiety levels and noted worsening short-term memory loss and forgetfulness, prompting a neurological evaluation in [place], with an MRI of the brain being completed. However, the records from these encounters are not available for secondary review, and no further workup was conducted. Due to escalating concerns regarding cognitive impairment, the patient was further evaluated at the [facility], and a neuropsychologist reportedly diagnosed her with Mild Cognitive Impairment (MCI) in early 2020. Again, these records are not available for secondary review. Over the subsequent two years, concerns grew regarding the patient's isolation and increased alcohol intake, coupled with a loss in weight and deteriorating nutrition. The patient also experienced increasing depression and anxiety, reportedly from a deteriorating relationship, and was reportedly told she had progressed to dementia in July 2022 by a neuropsychologist. Consequently, she moved to [place] to live with her son in November 2022, and since the relocation, her family  reports relative stability in her symptoms, with acute amnesia being the notable exception. 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 Since then, the patient is no longer responsible for handling large sums of money, taxes, or finances but remains fully independent in terms of chores, meal preparation, shopping, and handling small amounts of money. Upon presentation, the patient is described as pleasant and appropriate, with no cortical deficits noted, but displaying severe acute amnesia with significant lesion. This clinical presentation raises concerns for conditions such as Wernicke's Korsakoff's, and late-onset Alzheimer's disease cannot be ruled out, suggesting a likely mixed pathology given the reported clinical history. It is recommended to undertake additional diagnostic workups to definitively rule out conditions such as Alzheimer's disease and to screen for reversible causes of cognitive impairment. The family has requested a more comprehensive evaluation due to previously limited diagnoses by other providers.     Current Presentation  Recent/Interim History:  The recent serum laboratory results do not align with Alzheimer's Disease Related Pathology (ADRP). Magnetic Resonance Imaging (MRI) of the brain indicates hippocampal atrophy, with cortical preservation, and mild hyperintensities in the anterior mamillary body and anterior thalamus, raising concerns for Wernicke-Korsakoff syndrome (WKS). The diagnosis of Wernicke-Korsakoff syndrome was discussed, noting that the patient's unclear history of malnutrition and alcohol use between 2019 and 2022 likely caused subacute metabolic brain damage, as evidenced by MRI findings in the anterior thalamic/mammillary body regions. This damage is consistent with the patient's focal amnesia upon presentation. Serum biomarkers do  not support an Alzheimer's disease-related process, despite pronounced bilateral hippocampal volume loss. The absence of elevated lics445 and serum abeta42/40 levels argues against this etiology. Additionally, there is mild, asymmetric anterior temporal volume loss, predominantly on the right, not entirely attributable to alcohol abuse. This could indicate alcohol-related cortical volume loss or a [id number] anterior temporal degeneration. Unfortunately, there are no biomarkers available to confirm this diagnosis currently. Management recommendations include focusing on modifiable risk factors. The patient continues excessive alcohol consumption; thus, abstinence is strongly advised. We recommend initiating high-dose thiamine and vitamin B12 supplementation, coupled with dietary management involving the patient's her family. The value of cognitive rehabilitation through speech therapy was discussed and the patient expressed interest. However, the patient is currently not considering substance abuse programs. We will provide relevant reading materials via our social work team.  Unresolved Concern(s) reported by patient/family:  PREV lexapro  PREV donepezil, namenda     Review of cognitive, visuospatial, motor, sensory, and behavioral systems:     Memory:   The patient's memory has worsened in the past few years.  She does repeat statements or asks the same question repeatedly.  She does have difficulty remembering recent important conversations.  She does have difficulty remembering recent events. Comment: needs prompting  She does not forget information within minutes.  Her recent retrograde memory is intact.  Her remote memory is intact.  Attention:   The patient's attention and concentration are impaired.  She does not have attentional fluctuations.  She does have difficulty with selective attention.  She does become easily distracted.  She does have difficulty with divided attention.  Executive:   The  patient's cognitive processing speed is slower.  She does have difficulty with working memory.  She does misplace personal items (e.g., keys, cell phone, wallet) more frequently.  She does have difficulty keeping track of her medications.  She does have difficulty with planning/organizing/completing multistep tasks.  She does have not difficulty with executive attention.  She does have difficulty with flexible thinking.  She does not have difficulty with response inhibition.  She denies new impulsivity or rash/careless actions.  Her judgment is intact.  Language:   The patient's speech is affected.  She does forget people's names more frequently.  She does not have word-finding difficulties.  Her speech is fluent and non-effortful.  Her speech is grammatically intact.  She does not make word substitutions.  She does not have difficulty reading.  She does not appear to have impaired comprehension.  Visuospatial:   The patient has new visuospatial problems.  She has become confused or disoriented in *new*, unfamiliar places.  She does have trouble navigating.  She has gotten lost in familiar places.  She does not have visuospatial disorientation.  She does not have difficulty recognizing objects or faces.  She denies problems with driving or parking.  Motor/Coordination:   The patient does have difficulty with walking.  She does not feel imbalanced.  She denies having fallen.  She does not appear to have new muscle weakness.  She does not have difficulty buttoning shirts, operating zippers, or manipulating tools/utensils.  Her handwriting has not become micrographic.  She does not have a resting tremor.  She denies having any new involuntary movements and/or muscle jerking.  She does not have swallowing difficulty.  She denies new muscle cramps and twitching.  Sensory:   The patient reports a new sensory disturbance described as numbness, tingling, paresthesias, and/or pain.  The patient denies a loss of vision,  blurry vision, or double vision.  The patient denies new loss of hearing or worsening tinnitus.  The patient denies anosmia.  Sleep:   The patient reports difficulty sleeping.  The patient does have difficulty going to sleep.  The patient denies difficulty staying asleep or frequently awakening at night.  The patient does snore and/or have witnessed apneas while sleeping. Comment: very mild snoring  When she wakes up in the morning, she does feel well-rested.  She denies dream-enactment behavior.  She denies symptoms suggestive of restless leg syndrome.  Behavior:   The patient's personality has not changed.  She does not have symptoms of disinhibition and social inappropriateness.  She does not have symptoms to suggest a loss of manners or decorum.  She does not have apathy and/or decreased motivation.  She does not appear to have a change in inertia.  The patient's emotional expression has changed.  She does have emotional blunting or lability.  She does not have symptoms of irritability and mood lability.  She does not have symptoms of agitation, aggression, or violent outbursts.  Her insight into his disease and situation is impaired.  Her personal hygiene has become impaired.  She is not exhibiting a diminished response to other people's needs and feelings.  She is not exhibiting a diminished social interest, interrelatedness, or personal warmth.  She denies restlessness.  She denies new and/or worsening simple repetitive behaviors.  Her speech has not become simplified or become repetitive/stereotyped.  She reports symptoms that are suggestive of new/worsening complex repetitive/ritualistic compulsions and behaviors.  She does not have symptoms of hyper-religiosity or dogmatism.  Her interests/pleasures have not become restrictive, simplified, interrupting, or repetitive.  She denies a change of self-stimulating behavior.  She has had changes in eating behavior.  She denies increased consumption of food or  substances.  She denies oral exploration or consumption of inedible objects.  Psychiatric:   She does not feel depressed.  She is exhibiting symptoms of social withdrawal/indifference.  She does have anxiety.  She does not exhibit cycling behavior.  She does not exhibit hyperactive behavior.  She is not exhibited symptoms of paranoia.  She does not have delusions.  She does not have hallucinations.  She does not have a history of sensitivity to neuroleptic/psychotropic medications.  Medical Review of Systems:   The patient does not have constipation.  The patient does not have urinary incontinence.  The patient denies orthostatic lightheadedness.  The patient's weight is stable.  Functional status:  Difficulty performing the following Instrumental ADLs:  Housekeeping: No  Food Preparation: No  Shopping: No  Ability to Handle Finances: Yes  Transportation/Driving: No  Household Appliances/Stove: No  Laundry: No  Difficulty performing the following Basic ADLs:  Dressing: No  Bathing: No  Toileting: No  Personal hygiene and grooming: No  Feeding: No  Care Management:  Patient/Family Safety Concerns:  Medication Adherence: No  Home Safety: No  Wandered: No  Firearms: No  Fall Risk: No  Home Alone: No          No past medical history on file.    No past surgical history on file.    No family history on file.    Social History     Socioeconomic History    Marital status: Unknown       Medication:     No current outpatient medications on file prior to visit.     No current facility-administered medications on file prior to visit.        Review of patient's allergies indicates:  No Known Allergies    Medications Reconciliation:   I have reconciled the patient's home medications and discharge medications with the patient/family. I have updated all changes.  Refer to After-Visit Medication List.    Objective:  Vital Signs:  There were no vitals filed for this visit.  Wt Readings from Last 3 Encounters:   09/21/23 1045 52 kg  (114 lb 10.2 oz)     There is no height or weight on file to calculate BMI.           Neurological examination:  Mental Status:   Her appearance deviates from typical expectations given their age and context. Comment: looks older than stated age;  Throughout the interview, she is cooperative, her eye contact is appropriate.  Her behavior is appropriate to the clinical context without impropriety or improper language/conduct.  Her behavior was not characterized by episodes of sudden uncontrollable and inappropriate laughing or crying.  The patient is awake; Her energy level appeared normal.  Her orientation is normal; Spatial 5/5 (location, the floor of building, city, county, state) and temporal 5/5 (month, day, year, MOJGAN) dimensions are accurate.  She has appropriate attention/concentration (DARYA/MOJGAN forwards and backward).  She can complete three-step commands.  Her fund of knowledge was appropriate for age, culture, and level of education.  Her thought process is not logical or goal-oriented. Comment: circumstantial;  She demonstrated impaired insight based on actions, awareness of her illness, plans for the future.  She demonstrated good judgment based on actions and plans for the future.  She has no evidence of hallucinations (auditory, visual, olfactory).  She has no evidence of delusions (paranoid, grandiose, bizarre).  Cranial Nerves:   Her pupils were normal.  Her visual fields were full to confrontation in all quadrants.  Her ocular pursuit in the horizontal and vertical plane was complete.  Her saccadic initiation, velocity, and amplitude are normal.  Her facial strength was normal.  Her facial expression was symmetric and appropriate to the context.  Her hearing was diminished.  Her tongue showed no evidence of scalloping.  She tongue movement with normal.  She had no significant evidence of anterocollis or retrocollis.  Speech/Language:   The patient's speech was fluent, non-effortful, and her rate was  appropriate to the context.  She has no articulation (segmental features) errors.  The patient's speech is not dysarthric.  The patient's speech was without evidence of anomia.  She makes no phonological loop errors.  She can comprehend commands that cross the midline (e.g., with your left thumb, touch your right ear).  She can comprehend syntactically complex sentences.  Motor:   The patient's bilateral upper extremity muscle bulk is appropriate.  The patient's upper extremity muscle tone is increased.  There is evidence of rigidity/cogwheeling. Comment: B/L, R>L, Mild; Muscle tone is increased and there is evidence of rigidity/cogwheeling.  Assessment of motor strength was symmetric and at minimal anti-gravity.  There is no pronator or downward drift.  There is no myoclonus observed in The patient's bilateral upper and lower extremities.  There are no fasciculations observed in The patient's bilateral upper and lower extremities.  Coordination:   She has no bilateral upper extremity limb dysmetria or past pointing on finger-nose-finger bilaterally.  She has no limb dysdiadochokinesia of the upper extremity on the pronation/supination test and screwing in a light bulb or lower extremity during tapping ball of each foot bilaterally.  She has no visible tremor.  She has no evidence of interhemispheric motor control deficits.  She has no motor overflow bilaterally.  The patient's upper extremity fine motor coordination was abnormal. Comment: B/L, R>L, Mild;  The patient's upper extremity fine motor coordination was not slow. Comment: finger tapping, pronation/supination, and the open-close fist was slow.; finger tapping, pronation/supination, and the open-close fist was slow.  The patient's upper extremity fine motor coordination was not hypometric. Comment: finger tapping, pronation/supination, and the open-close fist showed hypometria.; finger tapping, pronation/supination, and the open-close fist showed  hypometria.  The patient's upper extremity fine motor coordination was not dysrhythmic. Comment: finger tapping, pronation/supination, and the open-close fist showed dysrhythmia.; finger tapping, pronation/supination, and the open-close fist showed dysrhythmia.  Higher Cortical Function:   The patient showed no evidence of simultanagnosia (Navon hierarchical letters).  The patient showed no evidence of visuospatial constructional dysfunction.  The patient showed no evidence of apraxia.  She showed no dysexecutive behavior.  Sensory:   Her cortical sensory assessment demonstrated no neglect bilaterally.  Her sensation was intact to light touch, and vibratory sense in the bilateral upper and lower extremities.  Reflexes:   Reflexes were symmetric and 2+ at biceps, 2+ triceps, and 2+ brachioradialis, 2+ at the knees bilaterally, there was no cross-abductor sign, 2+ in the bilateral ankles.  Gait:   She can arise from a chair and sit back down without using their arms.  Her gait was normal.  When attempting to walk abnormally (heels, tiptoes, tandem), she makes no errors.  She has no evidence of a specific gait disorder.  Neuropsychological Evaluation Summary:  Prior Neurocognitive/Neurobehavioral Evaluation(s)  No Prior Testing Available  2023-09-21:  Amnestic predominant mild cognitive impairment  Severe  Memory Impairment: encoding, recall, attention.  MMSE 21/30: MMSE Score suggestive of mild cognitive impairment.  MOCA 20/30: MOCA Score suggestive of mild cognitive impairment.  Neurocognitive/Neurobehavioral Evaluation completed on 2023-11-14    Neuropsychiatric/Behavioral Focused Evaluation Assessment    BEHAV5+ 2/6 See ROS section for a full description   Laboratories:     Lab Date Value [Reference]   Neurodegenerative Cerebrospinal Fluid Assessment           Abeta42 2023, Sep-21    <25 [pg/mL]      Phospho-Tau (181P) 2023, Sep-21    0.78 [0.00 - 0.97 pg/mL]      Cerebrospinal Fluid Assessment   IgG 09/21/2023   884 [650 - 1600 mg/dL]      Neuroendocrine/Electrolyte Screening   Magnesium 2023, Sep-21    2.1 [1.6 - 2.6 mg/dL]      BUN 2023, Sep-21    13 [8 - 23 mg/dL]      Chloride 2023, Sep-21    103 [95 - 110 mmol/L]      Creatinine 2023, Sep-21    0.7 [0.5 - 1.4 mg/dL]      Potassium 2023, Sep-21    3.8 [3.5 - 5.1 mmol/L]      Sodium 09/21/2023  138 [136 - 145 mmol/L]      Metabolic Screening   Methlymalonic Acid 09/21/2023  0.21 [<0.40 umol/L]      T4 Total 09/21/2023  5.2 [4.5 - 11.5 ug/dL]      TSH 2023, Sep-21    2.172 [0.400 - 4.000 uIU/mL]      Glucose 2023, Sep-21    82 [70 - 110 mg/dL]      Albumin 2023, Sep-21    4.1 [3.5 - 5.2 g/dL]      ALT 2023, Sep-21    14 [10 - 44 U/L]      AST 2023, Sep-21    20 [10 - 40 U/L]      BILIRUBIN TOTAL 2023, Sep-21    0.5 [0.1 - 1.0 mg/dL]      PROTEIN TOTAL 09/21/2023  7.4 [6.0 - 8.4 g/dL]      B12 Def. Methylmalonic Acid 09/21/2023  0.19 [<=0.40 nmol/mL]      Folate 09/21/2023  9.8 [4.0 - 24.0 ng/mL]      Thiamine 09/21/2023  69 [38 - 122 ug/L]      Vitamin B-12 2023, Sep-21    280 [180 - 914 ng/L]      Neurodegenerative Serum Fluid Assessment   NEUROFILAMENT LIGHT CHAIN, PLASMA 2023, Sep-21    16.2 [<=28.0 pg/mL]      Standard Hematology Screen   Hematocrit 2023, Sep-21    45.9 [37.0 - 48.5 %]      Hemoglobin 2023, Sep-21    14.7 [12.0 - 16.0 g/dL]      MCV 09/21/2023  97 [82 - 98 fL]      Infectious Disease/Immunocompromised Screening   Syphilis Treponemal Ab 2023, Sep-21    Nonreactive [Nonreactive]           Neuroimaging:    MRI brain/head without contrast on 9/23/2023  Formal interpretation by Radiology:  Age advanced medial temporal lobe volume loss clinical correlation advised. Slight prominence lateral and 3rd ventricles somewhat disproportionate to the degree of volume loss with normal pressure hydrocephalus not excluded. Clinical correlation advised No evidence for acute infarction or significant parenchymal edema signal abnormality.  Independently reviewed  radiological imaging by Daniel Wei MD. MPH. Behavioral Neurologist  T1: No significant cortical atrophy with age-appropriate changes. Mild to moderate subcortical nuclei and hippocampi atrophy bilateral.  T2/FLAIR: mild anterior periventricular capping with gliosis of bilateral hippocampi. mild anterior mamillary body/anterior thalamic hyperintensities  DWI/ADC: No Significant DWI hyperintensities/hypointensities. No ADC correlation.  SWI/GRE: No Significant hypointensities to suggest cortical/subcortical hemosiderin deposition.  Impression: : Moderate degree of hippocampi atrophy bilateral with relative preservation of cortex and mild anterior mamillary body/anterior thalamic hyperintensities consistent with WKS.     Procedures:    Electrocardiogram on 9/21/2023  Formal interpretation:  Vent. Rate : 066 BPM Atrial Rate : 066 BPM P-R Int : 182 ms QRS Dur : 096 ms QT Int : 396 ms P-R-T Axes : 076 -18 050 degrees QTc Int : 415 ms Normal sinus rhythm Incomplete right bundle branch block Borderline Abnormal ECG  Independently reviewed Electrocardiogram by Daniel Wei MD. MPH. Behavioral Neurologist  Impression: : Received ECG has no evidence of sinus node disease. HR (>=50-60). Prolonged MI interval (>0.22 s). Broad QRS complex (> 0.12 s).     Clinical Summary:     The patient is a 68-year-old right-handed female without a relevant past medical history who presents reporting a 4-year history of gradually progressive neurocognitive impairment.       The clinical history is suggestive of:  Memory Impairment: STM encoding impairment, LTM encoding-retrieval impairment  Attention Impairment: Attention, Selective attention, Sustained attention, Shifting attention  Executive Impairment: Energization, Working Memory  Language Impairment: Language Dysfunction  Visuospatial Impairment: Allocentric Spatial Processing  Motor/Coordination Impairment: Sensory motor integration  Behavior Impairment: Neurovegetative,  Emotional Regulation, Self-Preservation Dysregulation, Sensorimotor Dysregulation, Stimulation Dysregulation  Psychiatric Impairment: Social Coherence, Signal-Noise Dysregulation  iADL Impairment: [place] Instrumental Activities of Daily Living Scale  The neurological examination is significant for:  Executive Impairment: thought disorder  Movement Disorder (Hypokinetic): parkinsonism (tone, bradykinesia), dyskinesia (slowing, hypometria, dysrhythmia)  Sensory Dysfunction: hearing (diminished)  The neurocognitive battery is significant (based on age and education) for:  Amnestic predominant mild cognitive impairment     BEHAV5+ 2/6: See ROS section for a full description  The neurologically relevant imaging is significant for  MRI brain/head without contrast (9/23/2023): Moderate degree of hippocampi atrophy bilateral with relative preservation of cortex and mild anterior mamillary body/anterior thalamic hyperintensities consistent with WKS.        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     The patient's clinical presentation meets the criteria for Mild Cognitive Impairment (MCI-ADRC) (Juan Carlos MS, et al. 2011 Alzheimer's & Dementia).     Concern regarding an intraindividual change in cognition  Impairment in one or more cognitive domains  Preservation of independence in functional abilities.  Not demented     The patient's clinical presentation technically meets criteria for early stages of Late-Onset Alzheimer's Dementia (LOAD) (McCarolyneann et al. 2011)  Meets criteria for dementia.  Insidious onset over months to years.  Clear-cut history of worsening cognition by report or observation.  Amnestic presentation: impairment in learning and recall.        However, given the severity of focal amnesia  with relative preservation of other domains without any cortical findings on examination suggests there may be some degree of and Wernicke-Korsakoff Syndrome Spectrum (WKS).  At present, all neurodegenerative diseases can only be diagnosed with 100% certainty through a brain autopsy. The suspected neuropathology underlying the patient's neurocognitive impairment is likely a mixture of pathologies (Alzheimer's Disease Related Pathology, Vascular Contributions to Cognitive Impairment and Dementia, a metabolic/vitamin deficiency).  Plasma Protein Biomarkers: [09/21/2023] Neurofilament Light Chain (Nfl): 16.2.  Cerebrospinal Fluid Protein Biomarkers: [09/21/2023] Abeta42: 0 (L). Phospho-Tau (181P): 0.78.  There are no dermatological protein biomarkers available on record.  There is no relevant genetic biomarkers available on record.     he patient presents with a four-year history of gradual, progressive amnestic single-domain cognitive impairment, which marginally affects instrumental activities of daily living. Magnetic Resonance Imaging (MRI) of the brain reveals mild-to-moderate bilateral hippocampal volume loss, with a possible greater loss on the right than on the left in the anterior temporal regions. Serum biomarkers do not align with an Alzheimer's disease-related process, and there are no other apparent indications of frontotemporal degeneration. The limited history available from 2019 to 2022 suggests concerns of malnutrition coupled with chronic alcohol abuse. Collectively, the presentation aligns most closely with Wernicke-Korsakoff syndrome, as evidenced by the history of alcohol use, focal amnesia, and regional T2 hyperintensities in the mamillary bodies and anterior thalamic nuclei. There is, however, some mild asymmetrical volume loss in the anterior temporal region, more pronounced on the right side. It remains uncertain whether this is related to alcohol use or is indicative of a secondary  degenerative process. We will continue to monitor this aspect closely.     The observations made above, were discussed with the patient and their supporting historian(s) (son). We have discussed the additional diagnostic(s) and/or managenent below.     Care Management Plan:    #Diagnostic Screening for measurable forms of neurodegenerative pathology.  We have discussed opportunities for biomarker testing ([facility] [place] biomarkers, IDEAs Amyloid-PET, Syn-One skin biopsy) - do not strongly recommended at this time  #Optimize Neurocognitive Impairment and Quality  We have discussed the MIND Diet and other lifestyle behavior that may help maintain brain health.  We have provided written/digital reading material  Start high dose thiamine 250 mg q.day next start B12 5000 mcg q.day for 1 month and continue once per week thereafter  We have referred to Speech therapy for cognitive rehabilitation.  unlikely patient received meaningful clinical benefit from donepezil  #Optimize Behavioral Management and Quality.  No indication for memantine at this time  #Optimize Sleep Hygiene and Quality  We discussed and recommended additional diagnostic/management of sleep disorder to optimize brain health and longevity.  follow-up referrals to sleep medicine placed at last appointment  #Optimize Cerebrovascular Health.  The patient has a documented history of hyperlipidemia and/or hypercholesteremia with long-term complications such as cerebrovascular disease, peripheral vascular disease, and/or aortic atherosclerosis. Collectively these risk factors may contribute to cerebral atherosclerosis, and cerebral hypoperfusion compounded neurocognitive disorder. We discussed maximizing cerebrovascular-related medical therapy, including but not limited to cholesterol medications and antiplatelet agents. We have discussed the value of aggressively controlling vascular risk factors like hypertension, hyperlipidemia, and Diabetes SBP<130, LDL<100, and  A1C<7.0. We discussed the need to optimize lifestyle choices, including a heart-healthy diet (e.g., Mediterranean or DASH), increased cardiovascular exercise (goal 150 minutes of moderate-intensity per week), and staying cognitively and socially active.  We recommend switching patients from ongoing lipophilic statin therapy to hydrophilic statin therapy (Bradley et al., 2015; William et al., 2018; Lindsay et al., 2021, Wyatt et al., 2021).  Following fasting lipid profile results, we will consider adding pravastatin or rosuvastatin.  Continue aspirin 81 mg  #Behavioral/Environmental Strategies  We recommend engaging in activities that stimulate cognitively and socially while avoiding excessive stimulation and fatigue in overwhelmingly complex situations.  We recommend integrating routine and schedule into your daily life. https://www.alzheimersproject.org/news/the-importance-of-routine-and-familiarity-to-persons-with-dementia/  #Health Maintenance/Lifestyle Advice  We have discussed the value in aggressively controlling vascular risk factors like hypertension, hyperlipidemia, and Diabetes SBP<130, LDL<100, A1C<7.0.  We discussed the need to optimize lifestyle choices including a heart-healthy diet (e.g., Mediterranean or DASH), increased cardiovascular exercise (goal 150 minutes of moderate-intensity per week), and stay cognitively and socially active.  We recommend the MIND diet, a combination of two healthy diets: the Mediterranean diet and the DASH (Dietary Approaches to Stop Hypertension) diet, and includes a variety of brain-friendly foods to optimize cognitive health and longevity.  #Support  We all need support sometimes. Get easy access to local resources, community programs, and services. https://www.communityresourcefinder.org/  Learn more about Cognitive Impairment in [place]: https://www.alz.org/professionals/public-health/state-overview/[place]  #Safety  The Alzheimer's Association administers the  "nationwide "Safe Return" program with identification bracelets, necklaces, or clothing tags and 24-hour assistance. More information is available online at https://www.alz.org/help-support/caregiving/safety/medicalert-with-24-7-wandering-support  #Follow up:  Follow-up as needed.    Thank you for allowing us to participate in the care of your patient. Please do not hesitate to contact us with any questions or concerns.     It was a pleasure seeing The patient and we look forward to seeing them at their follow-up visit.     This note is dictated on M*Modal Fluency Direct word recognition program. There are word recognition mistakes that are occasionally missed on review.         Scheduled Follow-up :  No future appointments.    After Visit Medication List :     Medication List            Accurate as of November 14, 2023  3:58 PM. If you have any questions, ask your nurse or doctor.                START taking these medications      cyanocobalamin (vitamin B-12) 5,000 mcg Subl  Place one under tongue once a day for 1 month, then continue to take under tongue per week  Started by: Daniel Gatica MD     thiamine 250 MG tablet  Take 1 tablet (250 mg total) by mouth once daily.  Started by: Daniel Gatica MD               Where to Get Your Medications        These medications were sent to [facility]/pharmacy #[id number] [place] [id number] [facility]  [address] [facility] [address]      Phone: [phone]   cyanocobalamin (vitamin B-12) 5,000 mcg Subl  thiamine 250 MG tablet         Signing Physician:  Daniel Gatica MD    Billing:        -----------------------------------------------------------------------------    I performed this consultation using real-time Telehealth tools, including a live video connection between my location and the patient's location ([facility] home [facility]). Prior to initiating the consultation, I obtained informed verbal consent to perform this consultation using Telehealth " tools and answered all the questions about the Telehealth interaction. The participants understand that only a limited neurological exam and limited neuropsychological testing can be performed using Telehealth tools.    I spent a total of 45 minutes (time-in: 15:15 PM; time-out: 16:00 PM) on 2023-11-14, virtually face-to-face with the patient and caregiver(s), >50% of that time was spent counseling regarding the symptoms, treatment plan, risks, therapeutic options, lifestyle modifications, and/or safety issues for the diagnoses above.    10/14 Review of Systems completed and is negative except as stated above in HPI (Systems reviewed: Const, Eyes, ENT, Resp, CV, GI, , MSK, Skin, Neuro)    I reviewed previous labs for a total of 5 minutes on 2023-11-14. This is directly related to the face-to-face encounter. Review of previous labs was performed all negative except as stated above in HPI    I reviewed previous diagnostic testing for a total of 5 minutes on 2023-11-14. This is directly related to the face-to-face encounter. A review of previous diagnostic testing was performed was noted to be within normal limits except as is stated above in HPI    I independently reviewed radiological imaging, specimen, and tracing for a total of 15 minutes on 2023-11-14. This is directly related to the face-to-face encounter. Independent review of radiological imaging, specimen, and tracing was noted to be within normal limits except as stated above in HPI    I performed a neurobehavioral status examination that included a clinical assessment of thinking, reasoning, and judgment. Please see above HPI and ROS for full details. This exam was performed on 2023-11-14 and included 15 minutes spent on direct face-to-face clinical observation and interview with the patient and 19 minutes spent interpreting test results and preparing the report. The total time of 34 minutes spent on the neurobehavioral status examination is not included  in the time spent on evaluation and management coding.    Total Billing time spent on encounter/documentation for this patient's evaluation and management, not including the neurobehavioral status examination: 55 minutes.

## 2023-11-21 ENCOUNTER — CLINICAL SUPPORT (OUTPATIENT)
Dept: REHABILITATION | Facility: HOSPITAL | Age: 68
End: 2023-11-21
Attending: PSYCHIATRY & NEUROLOGY
Payer: MEDICARE

## 2023-11-21 DIAGNOSIS — G31.84 MCI (MILD COGNITIVE IMPAIRMENT): ICD-10-CM

## 2023-11-21 DIAGNOSIS — R41.3 MEMORY CHANGE: Primary | ICD-10-CM

## 2023-11-21 DIAGNOSIS — F04 WERNICKE-KORSAKOFF SYNDROME: ICD-10-CM

## 2023-11-21 PROCEDURE — 96125 COGNITIVE TEST BY HC PRO: CPT | Mod: PO

## 2023-11-21 NOTE — PLAN OF CARE
[facility]  Speech Therapy Evaluation -Neurological Rehabilitation     Name: Abbey Hendrickson   MRN: [id number]    Therapy Diagnosis: No diagnosis found.   Physician: Daniel Gatica MD  Physician Orders: Ambulatory Referral to Speech Therapy   Medical Diagnosis:   G31.84 (ICD-10-CM) - MCI (mild cognitive impairment)   F04 (ICD-10-CM) - Wernicke-Korsakoff syndrome       Visit # / Visits Authorized:  1 / 1   Date of Evaluation:  11/21/2023   Insurance Authorization Period: 11/14/2023 to 1/16/2024  Plan of Care Certification:    11/21/2023 to 1/2/2023     Time In:11:34 am    Time Out: 12:17 pm   Total time: 43 minutes    Procedure   Cognitive Communication Evaluation including scoring and interpretation (Total time: 75 minutes)     Precautions: Standard and Fall    Subjective      Date of Onset: early 2019   History of Current Condition:  Abbey Hendrickson is a 68 y.o. female who presents to [facility] for evaluation secondary to mild cognitive impairment and Wernicke-Korsakoff syndrome. Patient was referred to therapy by Daniel Gatica MD , which is the patient's neurologist. Patient reports mostly having difficulty with her memory, organizing things, and some difficulty with problem solving. She reported that she has had no difficulty with reading and writing. Patient reported wanting to stay active and find places to volunteer and stay active in. Patient was accompanied to the evaluation by her son. He reported being concerned with her going to the bar and being stuck there and not knowing how to be able to find her way home.   Past Medical History: Abbey Hendrickson  has no past medical history on file.  Abbey Hendrickson  has no past surgical history on file.  Medical Hx and Allergies: Abbey has a current medication list which includes the following prescription(s): cyanocobalamin (vitamin b-12) and thiamine. Review of patient's allergies  indicates:  No Known Allergies  Prior Therapy:  no   Social History:  Patient lives with her son in [place], Patient is not currently driving  Occupation:  previously an    Prior Level of Function: Independent   Current Level of Function: Modified Independent  Pain Scale: no pain indicated throughout session  Patient's Therapy Goals:  help with memory, help to plan activities and places to volunteer at    Objective      Formal Assessment:  Cognitive Linguistic Quick Test (CLQT), Aphasia Administration was administered to quickly assess the patient's overall cognitive-linguistic function and to determine cognitive strengths and weaknesses.     Cognitive Domain  Score  Severity Rating    Attention 95/215 moderate   Memory 107/185 severe   Executive Functions 2340 mild   Language 29/37 WFL   Visuospatial Skills 51/105 moderate   Clock Drawing 11/13 mild          Composite Score = 2.4 /4 moderate     Task Score Ages 18-69 Cut Score Ages 70-89 Cut Score Below?   Personal Facts  8  8 8 average   Symbol Cancellation 4  11 10 below average   Confrontational naming  10  10 10 average   Clock drawing  11  12 11 below average   Story Retelling 4  6 5 below average   Symbol Trails 5  9 6 below average   Generative Naming 7 5 4 above average   Design Memory 2  5 4 below average   Mazes 7  7 4 average   Design Generation  4  6 5 below average       Cognition: Cognitive communication skills are considered moderately impaired. Patient answered orientation questions with 100% accuracy. Patient cancelled pre-determined symbol out of a field of many similar looking symbols with 33% accuracy, indicating below average selective attention skills. Patient named line item photographs with 100% accuracy. Patient scored 11  (cut off score 12) on clock drawing task, indicating below planning, organizing, self-monitoring, and self-correction as the patient's clock model contained 12/12 numbers without appropriate spacing and  orientation, 2/2 hands, and was set to the correct time. Patient recalled 7/18 details from a paragraph presented auditorily. Patient answered 3/6 y/n questions about the paragraph indicating below average  comprehension and auditory recall. When asked to recall the details upon conclusion of the test, patient was able to recall no details. Patient completed a symbol trails task (alternating size and shape) with 50% acc indicating below average divided attention.  Patient completed divergent naming of concrete categories with 18 named items within one minute; completed divergent naming of abstract categories with 14 named items within one minute.  Patient recalled  2/6 designs in a design recall task indicating below average visual recall skills. Patient completed a simple maze with  100% acc; completed a complex maze with 75% acc indicating  average planning and organization for simple and complex information. Patient created 10 designs with 4 lines, 1 designs with more than 4 lines, 0 designs with less than 4 lines, and 3 perseverative designs indicating below average mental flexibility skills.  Patient was alert and cooperative throughout evaluation. She was oriented to person, time, place, and situation. She did not require cues to attend to evaluation tasks throughout session. Patient with reduced insight into severity of deficits.      Treatment       Total Treatment Time Separate from Evaluation: not applicable   No treatment performed secondary to time to complete evaluation.     Education provided:   -role of Speech Therapy, goals/plan of care, scheduling/cancellations, insurance limitations with patient  -Additional Education provided:     Patient and family members expressed understanding.     Home Program: no home program established at this time    Assessment      Abbey presents to [facility] status post medical diagnosis of mild cognitive impairment and Wernicke-Korsakoff syndrome.      Interpretation of objective assessment: Cognition: Cognitive communication skills are considered moderately impaired. Patient answered orientation questions with 100% accuracy. Patient cancelled pre-determined symbol out of a field of many similar looking symbols with 33% accuracy, indicating below average selective attention skills. Patient named line item photographs with 100% accuracy. Patient scored 11  (cut off score 12) on clock drawing task, indicating below planning, organizing, self-monitoring, and self-correction as the patient's clock model contained 12/12 numbers without appropriate spacing and orientation, 2/2 hands, and was set to the correct time. Patient recalled 7/18 details from a paragraph presented auditorily. Patient answered 3/6 y/n questions about the paragraph indicating below average  comprehension and auditory recall. When asked to recall the details upon conclusion of the test, patient was able to recall no details. Patient completed a symbol trails task (alternating size and shape) with 50% acc indicating below average divided attention.  Patient completed divergent naming of concrete categories with 18 named items within one minute; completed divergent naming of abstract categories with 14 named items within one minute.  Patient recalled  2/6 designs in a design recall task indicating below average visual recall skills. Patient completed a simple maze with  100% acc; completed a complex maze with 75% acc indicating  average planning and organization for simple and complex information. Patient created 10 designs with 4 lines, 1 designs with more than 4 lines, 0 designs with less than 4 lines, and 3 perseverative designs indicating below average mental flexibility skills.  Patient was alert and cooperative throughout evaluation. She was oriented to person, time, place, and situation. She did not require cues to attend to evaluation tasks throughout session. Patient with reduced insight  into severity of deficits.    She presents with moderately impaired cognitive skills characterized by severely impaired memory and moderately impaired attention and visual spatial skills. She presented with mildly impaired executive functioning skills and language was within functional limits. She demonstrated weaknesses in selective attention, divided attention, auditory comprehension and recall, and problem solving skills with more complex information. She demonstrated strengths in generative naming and confrontation naming.     Demonstrates impairments including limitations as described in the problem list.     Positive prognostic factors: family support   Negative prognostic factors: medical diagnosis  Barriers to therapy: No barriers to therapy identified.     Patient's spiritual, cultural, and educational needs considered and patient agreeable to plan of care and goals.  Patient will benefit from skilled therapy.    Rehab Potential: fair    Short Term Goals: (6 weeks) Current Progress:   Patient  will recall 4/4 memory strategies and give an example of implementation of each.       Progressing/ Not Met 11/21/2023   Established this date   2. Patient will complete a simple written task to increase verbal attention and memory (I.e. sample bill paying activity, recipe, or multiple choice comprehension questions to 1 paragraphs) with 80% accuracy and natural environment noise distractions (TV news background, music, etc.).     Progressing/ Not Met 11/21/2023   Established this date    3. Pt will complete Goal-Plan-Action-Review with 90% accuracy independently.       Progressing/ Not Met 11/21/2023   Established this date    4. Patient will complete tasks requiring divided attention and alternating attention with 90% accuracy given min cues to improve attention skills  Progressing/ Not Met 11/21/2023   Established this date    5. Pt will complete moderate level problem solving tasks with 90% accuracy independently.       Progressing/ Not Met 11/21/2023   Established this date    6. - Patient will immediately recall information from auditory stimuli using memory retrieval strategies with 90 % acc with min  cues to improve auditory recall.      Progressing/ Not Met 11/21/2023   Established this date    7. Patient will create a list of socialization opportunities and create steps to initiate opportunity with min A from clinician.     Progressing/ Not Met 11/21/2023   Established this date        Long Term Goals: (6 weeks) Current Progress:   She will use appropriate memory strategies to schedule and recall weekly activities, express needs and recall names to maintain safety and participate socially in functional living environment.    Established this date     2. She  will demonstrate improved problem solving skills and provide appropriate solutions to problems in order to improve safety and awareness in functional living environment.      Established this date     3. She  will maintain functional cognitive-linguistic based skills and utilize compensatory strategies to communicate wants and needs effectively to different conversational partners, maintain safety, and participate socially in functional living environment.      Established this date           Plan    Recommended Treatment Plan:  Patient will participate in the [facility] program for speech therapy 1 times per week for 6 weeks to address her Cognition deficits, to educate patient and their family, and to participate in a home exercise program.      Therapist's Name:   ROCHELLE Sanches   11/21/2023           Physician's Signature: _________________________________________ Date: ________________

## 2023-11-21 NOTE — PROGRESS NOTES
Please see initial plan of care for evaluation details.     Halle Munroe, LP  Student Speech Language Pathologist  11/21/2023

## 2023-11-21 NOTE — PROGRESS NOTES
[facility]  Speech Therapy Evaluation -Neurological Rehabilitation     Name: Abbey Hendrickson   MRN: [id number]    Therapy Diagnosis: No diagnosis found.   Physician: Daniel Gatica MD  Physician Orders: Ambulatory Referral to Speech Therapy   Medical Diagnosis:   G31.84 (ICD-10-CM) - MCI (mild cognitive impairment)   F04 (ICD-10-CM) - Wernicke-Korsakoff syndrome       Visit # / Visits Authorized:  1 / 1   Date of Evaluation:  11/21/2023   Insurance Authorization Period: 11/14/2023 to 1/16/2024  Plan of Care Certification:    11/21/2023 to ***     Time In:11:37 am    Time Out: ***   Total time: ***    Procedure   {CPT eval codes OP ST:90219}   {CPT eval codes OP ST:13395}     Precautions: {OP ST Precautions:19620}    Subjective      Date of Onset: early 2019   History of Current Condition:  Abbey Hendrickson is a 68 y.o. female who presents to [facility] for evaluation secondary to mild cognitive impairment and Wernicke-Korsakoff syndrome. Patient was referred to therapy by Daniel Gatica MD , which is the patient's neurologist. Patient reports ***.   Patient was accompanied to the evaluation by ***, her ***.   Past Medical History: Abbey Hendrickson  has no past medical history on file.  Abbey Hendrickson  has no past surgical history on file.  Medical Hx and Allergies: Abbey has a current medication list which includes the following prescription(s): cyanocobalamin (vitamin b-12) and thiamine. Review of patient's allergies indicates:  No Known Allergies  Prior Therapy:  no   Social History:  Patient lives with her son in [place], Patient is not currently driving  Occupation:  previously an    Prior Level of Function: Independent   Current Level of Function: Modified Independent  Pain Scale: no pain indicated throughout session  Patient's Therapy Goals:  help with memory     Objective      Formal Assessment:  {ST Evaluation  Assessments:57931}    Treatment       {SLP Treatment:01900}    Education provided:   -role of Speech Therapy, goals/plan of care, scheduling/cancellations, insurance limitations with patient  -Additional Education provided:   {SLP OP Education Eval:14821}    Patient {and and/or or:21656} family members expressed understanding.     Home Program: ***    Assessment      Abbey presents to [facility] status post medical diagnosis of ***.     Interpretation of objective assessment: ***  She presents with *** characterized by ***.    Demonstrates impairments including limitations as described in the problem list.     Positive prognostic factors: ***  Negative prognostic factors: ***  Barriers to therapy: {barriers to therapy:32028}     Patient's spiritual, cultural, and educational needs considered and patient agreeable to plan of care and goals.    Patient {will/will not:32444} benefit from skilled therapy.    Rehab Potential: {DESC; POOR/FAIR/GOOD/EXCELLENT:98218}    Short Term Goals: (*** {WEEKS/MONTHS EC:53995}) Current Progress:   ***    Progressing/ Not Met 11/21/2023   Established this date   ***     Progressing/ Not Met 11/21/2023   Established this date   ***     Progressing/ Not Met 11/21/2023   Established this date    ***     Progressing/ Not Met 11/21/2023   Established this date    ***     Progressing/ Not Met 11/21/2023   Established this date    ***     Progressing/ Not Met 11/21/2023   Established this date    ***     Progressing/ Not Met 11/21/2023   Established this date        Long Term Goals: (*** {WEEKS/MONTHS EC:65174}) Current Progress:   ***   Established this date     ***     Established this date     ***     Established this date     ***     Established this date     ***     Established this date     ***    Established this date     ***    Established this date           Plan    Recommended Treatment Plan:  Patient will participate in the [facility] program for  speech therapy {NUMBER 1-5:50445} times per week for {NUMBER 1-16:50575} weeks to address her {SLP TREATMENT AREAS:2849350835} deficits, to educate patient and their family, and to participate in a home exercise program.    Other Recommendations:   {SLP recomendations:82321}    Therapist's Name:   ROCHELLE Sanches   11/21/2023           Physician's Signature: _________________________________________ Date: ________________

## 2023-11-28 ENCOUNTER — CLINICAL SUPPORT (OUTPATIENT)
Dept: REHABILITATION | Facility: HOSPITAL | Age: 68
End: 2023-11-28
Payer: MEDICARE

## 2023-11-28 DIAGNOSIS — R41.3 MEMORY CHANGE: Primary | ICD-10-CM

## 2023-11-28 PROCEDURE — 97130 THER IVNTJ EA ADDL 15 MIN: CPT | Mod: PO

## 2023-11-28 PROCEDURE — 97129 THER IVNTJ 1ST 15 MIN: CPT | Mod: PO

## 2023-11-28 NOTE — PROGRESS NOTES
[facility]  Speech Therapy Treatment Note- Neurological Rehabilitation  Date: 11/28/2023     Name: Abbey Hendrickson   MRN: [id number]   Therapy Diagnosis:   Encounter Diagnosis   Name Primary?    Memory change Yes   Physician: Daniel Gatica MD  Physician: Daniel Gatica MD  Physician Orders: Ambulatory Referral to Speech Therapy   Medical Diagnosis:   G31.84 (ICD-10-CM) - MCI (mild cognitive impairment)   F04 (ICD-10-CM) - Wernicke-Korsakoff syndrome         Visit # / Visits Authorized:  1 / 23 (plus evaluation)   Date of Evaluation:  11/21/2023   Insurance Authorization Period: 11/14/2023 to 1/16/2024  Plan of Care Expiration Date:    1/2/2024  Extended Plan of Care:  n/a   Progress Note: 12/21/2023     Time In:  3:42 pm   Time Out:  4:20 pm  Total Billable Time: 33 minutes (patient was late)    Precautions: Standard and Cognition  Subjective:   Patient reports: that she has been retired since September 30 or October 30 of last year (clinician needed clarification of year). Patient repeated herself several times.    She was compliant to home exercise program.   Response to previous treatment: good  Pain Scale: no pain indicated throughout session  Objective:   TIMED  Procedure Min.   Cognitive Therapeutic Interventions, first 15 minutes CPT 82366  15   Cognitive Therapeutic Interventions, each additional 15 minutes CPT 05384  18           Short Term Goals: (6 weeks) Current Progress:   Patient  will recall 4/4 memory strategies and give an example of implementation of each.         Progressing/ Not Met    Introduced WRAP  Write It  Repeat It  Associate It  Picture It    Instructed patient to write down the information in a planner or notebook. Patient was given a folder to keep the handouts given to her.      2. Patient will complete a simple written task to increase verbal attention and memory (I.e. sample bill paying activity, recipe, or multiple choice comprehension questions to 1 paragraphs)  with 80% accuracy and natural environment noise distractions (TV news background, music, etc.).      Progressing/ Not Met  Constant Therapy - put steps in order 95% accuracy  needed cues to continue the task and to attend to the details.    Constant Therapy - understanding voicemail - 60% accuracy    3. Pt will complete Goal-Plan-Action-Review with 90% accuracy independently.         Progressing/ Not Met    Introduced and provided examples. Patient will look into opportunities such as volunteering with animals and interpreting.     Instructed patient to write down the information in a planner or notebook. Patient was given a folder to keep the handouts given to her.      4. Patient will complete tasks requiring divided attention and alternating attention with 90% accuracy given min cues to improve attention skills  Progressing/ Not Met   For all tasks today-  Door opened with conversations in hallway and interactions with clinician. Cues needed to redirect to task and to pay attention.    5. Pt will complete moderate level problem solving tasks with 90% accuracy independently.       Progressing/ Not Met   Constant Therapy - put steps in order 95% accuracy.  needed cues to continue the task and to attend to the details.     6. - Patient will immediately recall information from auditory stimuli using memory retrieval strategies with 90 % acc with min  cues to improve auditory recall.       Progressing/ Not Met    Constant Therapy - understanding voicemail - 60% accuracy      Instructed patient to write down the information in a planner or notebook. Patient was given a folder to keep the handouts given to her.    7. Patient will create a list of socialization opportunities and create steps to initiate opportunity with min A from clinician.      Progressing/ Not Met    Discussed volunteering at animal facilities like [facility], animal shelters and interpreting as an .     Discussed getting involved with [facility]ortun[facility] for social activities with Seniors.             Patient Education/Response:   Patient educated regarding the followin. Volunteer opportunities  2. [facility] to socialize with other Seniors  3.   WRAP Write, Repeat, Associate, Picture - group of strategies for recall   Mental Rehearsal For "thinking through" your plan for the next day   Lists Create a list each night for the next day   Sticky Notes Bright and with specific instructions   Alarms  For things you need to remember regularly (i.e. Meals)   Mental Review How did I do with my strategies today?   Goal  Plan  Action  Review Goal - what is my goal?  Plan - how will I do it?  Action - try to complete goal  Review- how did it go?      Home program established: Program modified based on patient progress  Patient verbalized understanding to all above education provided.     See Electronic Medical Record under Patient Instructions for exercises provided throughout therapy.  Assessment:   Abbey  participated well today in today's session which focused on memory, sustained attention, alternating attention, problem solving, meta-cognitive strategy training, and education. Today strengths were noted in patient motivation. Improvement continues to be noted in awareness for social interaction. Deficits remain in attention and memory. Cognitive, Physical, and Emotional fatigue were not believed to have been a barrier to the session.  To date, Abbey has met 0 goals.     Abbey is progressing well towards her goals. Current goals remain appropriate. Goals to be updated as necessary.     Patient prognosis is Fair. Patient will continue to benefit from skilled outpatient speech and language therapy to address the deficits listed in the problem list on initial evaluation, provide patient/family education and to maximize patient's level of independence in the home and community environment.   Medical necessity is " demonstrated by the following IMPAIRMENTS:  Cognition: Deficits in executive functioning, attention, and memory prevent the pt from relaying medically and safety relevant information in a timely manner in a state of emergency.   Barriers to Therapy: none  Patient's spiritual, cultural and educational needs considered and patient agreeable to plan of care and goals.  Plan:   Continue Plan of Care with focus on rehabilitation and compensation for cognitive function in everyday tasks. Speech Therapy for  1 x per week for 6 weeks    GLADIS Douglas, CCC-SLP, CBIS   11/28/2023

## 2023-12-01 NOTE — PROGRESS NOTES
[facility]  Speech Therapy Treatment Note- Neurological Rehabilitation  Date: 12/4/2023     Name: Abbey Hendrickson   MRN: [id number]   Therapy Diagnosis:   Encounter Diagnosis   Name Primary?    Memory change Yes     Physician: Daniel Gatica MD  Physician Orders: Ambulatory Referral to Speech Therapy   Medical Diagnosis:   G31.84 (ICD-10-CM) - MCI (mild cognitive impairment)   F04 (ICD-10-CM) - Wernicke-Korsakoff syndrome       Visit # / Visits Authorized:  2/23 (plus evaluation)   Date of Evaluation:  11/21/2023   Insurance Authorization Period: 11/14/2023 to 1/16/2024  Plan of Care Expiration Date:    1/2/2024  Extended Plan of Care:  n/a   Progress Note: 12/21/2023     Time In:  3:43 pm   Time Out:  4:30 pm  Total Billable Time: 47 minutes    Precautions: Standard and Cognition  Subjective:   Patient reports: that she had a good, relaxing weekend. Went for long walks.    She was compliant to home exercise program.   Response to previous treatment: good  Pain Scale: 5/10 on a Visual Analog Scale currently.  Pain Location: right upper thigh, chronic  no pain indicated throughout session  Objective:     TIMED  Procedure Min.   Cognitive Therapeutic Interventions, first 15 minutes CPT 30675 15   Cognitive Therapeutic Interventions, each additional 15 minutes CPT 02208 32          Short Term Goals: (6 weeks) Current Progress:   Patient  will recall 4/4 memory strategies and give an example of implementation of each.    Progressing/ Not Met 12/4/2023   Discussed WRAP at length (Write It, Repeat It, Associate It, Picture It)    Patient recalled 0/4 independently at beginning of session; patient recalled 2/4 independently at end of session.   2. Patient will complete a simple written task to increase verbal attention and memory (I.e. sample bill paying activity, recipe, or multiple choice comprehension questions to 1 paragraphs) with 80% accuracy and natural environment noise distractions (TV news  "background, music, etc.).   Progressing/ Not Met 12/4/2023   Not formally addressed     3. Patient will complete Goal-Plan-Action-Review with 90% accuracy independently.    Progressing/ Not Met 12/4/2023   Not formally addressed     4. Patient will complete tasks requiring divided attention and alternating attention with 90% accuracy given min cues to improve attention skills  Progressing/ Not Met 12/4/2023   Constant Therapy find alternating symbols level 3: 94% accuracy independently    Constant Therapy find alternating symbols level 4: 96% accuracy independently    Constant Therapy find alternating words level 1: 100% accuracy given moderate cues    5. Patient will complete moderate level problem solving tasks with 90% accuracy independently.       Progressing/ Not Met 12/4/2023   Patient identified errors (simple and moderately complex) in pill box given pictured pill box and medication instructions (see picture below): 66% accuracy independently; 100% accuracy given minimum to moderate cues       6. - Patient will immediately recall information from auditory stimuli using memory retrieval strategies with 90 % acc with min  cues to improve auditory recall.       Progressing/ Not Met 12/4/2023   Not formally addressed     7. Patient will create a list of socialization opportunities and create steps to initiate opportunity with min A from clinician.      Progressing/ Not Met 12/4/2023   Not formally addressed            Patient Education/Response:   Patient educated regarding the following:  Memory, cognitive, attention strategies   WRAP Write, Repeat, Associate, Picture - group of strategies for recall   Mental Rehearsal For "thinking through" your plan for the next day   Lists Create a list each night for the next day   Sticky Notes Bright and with specific instructions   Alarms  For things you need to remember regularly (i.e. Meals)   Mental Review How did I do with my strategies today? "   Goal  Plan  Action  Review Goal - what is my goal?  Plan - how will I do it?  Action - try to complete goal  Review- how did it go?      Home program established: Program modified based on patient progress  Patient verbalized understanding to all above education provided.     See Electronic Medical Record under Patient Instructions for exercises provided throughout therapy.  Assessment:   Abbey participated well today in today's session which focused on memory, alternating attention, problem solving, and education.  Patient able to complete simple alternating attention tasks without difficulty; as complexity increased level of cueing required also increased. Significant education regarding memory strategies completed. Cognitive, Physical, and Emotional fatigue was not believed to have been a barrier to the session.   Abbey is progressing well towards her goals. Current goals remain appropriate. Goals to be updated as necessary.     Patient prognosis is Fair. Patient will continue to benefit from skilled outpatient speech and language therapy to address the deficits listed in the problem list on initial evaluation, provide patient/family education and to maximize patient's level of independence in the home and community environment.   Medical necessity is demonstrated by the following IMPAIRMENTS:  Cognition: Deficits in executive functioning, attention, and memory prevent the pt from relaying medically and safety relevant information in a timely manner in a state of emergency.   Barriers to Therapy: none  Patient's spiritual, cultural and educational needs considered and patient agreeable to plan of care and goals.  Plan:   Continue Plan of Care with focus on rehabilitation and compensation for cognitive function in everyday tasks.      GLADIS Rodriguez, L-SLP, CCC-SLP  Speech Language Pathologist   12/4/2023

## 2023-12-04 ENCOUNTER — CLINICAL SUPPORT (OUTPATIENT)
Dept: REHABILITATION | Facility: HOSPITAL | Age: 68
End: 2023-12-04
Payer: MEDICARE

## 2023-12-04 DIAGNOSIS — R41.3 MEMORY CHANGE: Primary | ICD-10-CM

## 2023-12-04 PROCEDURE — 97130 THER IVNTJ EA ADDL 15 MIN: CPT | Mod: PO | Performed by: SPEECH-LANGUAGE PATHOLOGIST

## 2023-12-04 PROCEDURE — 97129 THER IVNTJ 1ST 15 MIN: CPT | Mod: PO | Performed by: SPEECH-LANGUAGE PATHOLOGIST

## 2023-12-13 ENCOUNTER — CLINICAL SUPPORT (OUTPATIENT)
Dept: REHABILITATION | Facility: HOSPITAL | Age: 68
End: 2023-12-13
Payer: MEDICARE

## 2023-12-13 DIAGNOSIS — R41.3 MEMORY CHANGE: Primary | ICD-10-CM

## 2023-12-13 PROCEDURE — 97130 THER IVNTJ EA ADDL 15 MIN: CPT | Mod: PO | Performed by: SPEECH-LANGUAGE PATHOLOGIST

## 2023-12-13 PROCEDURE — 97129 THER IVNTJ 1ST 15 MIN: CPT | Mod: PO | Performed by: SPEECH-LANGUAGE PATHOLOGIST

## 2023-12-13 NOTE — PROGRESS NOTES
[facility]  Speech Therapy Treatment Note- Neurological Rehabilitation  Date: 12/13/2023     Name: Abbey Hendrickson   MRN: [id number]   Therapy Diagnosis:   Encounter Diagnosis   Name Primary?    Memory change Yes     Physician: Daniel Gatica MD  Physician Orders: Ambulatory Referral to Speech Therapy   Medical Diagnosis:   G31.84 (ICD-10-CM) - MCI (mild cognitive impairment)   F04 (ICD-10-CM) - Wernicke-Korsakoff syndrome       Visit # / Visits Authorized: 3/23 (plus evaluation)   Date of Evaluation:  11/21/2023   Insurance Authorization Period: 11/14/2023 to 1/16/2024  Plan of Care Expiration Date:    1/2/2024  Extended Plan of Care:  n/a   Progress Note: 12/21/2023     Time In:  10:35 am    Time Out: 11:15 am  Total Billable Time: 40 minutes    Precautions: Standard and Cognition  Subjective:   Patient reports: Patient endorses that she feels like her memory is improving because of her awareness of it.   She was compliant to home exercise program.   Response to previous treatment: good  Pain Scale: 5/10 on a Visual Analog Scale currently.  Pain Location: right upper thigh, chronic  no pain indicated throughout session  Objective:     TIMED  Procedure Min.   Cognitive Therapeutic Interventions, first 15 minutes CPT 34519 15   Cognitive Therapeutic Interventions, each additional 15 minutes CPT 39626 25           Short Term Goals: (6 weeks) Current Progress:   Patient  will recall 4/4 memory strategies and give an example of implementation of each.    Progressing/ Not Met 12/13/2023   Discussed WRAP at length (Write It, Repeat It, Associate It, Picture It)    Patient recalled 0/4 independently at beginning of session; patient recalled 3/4 given mnemonic at end of session.   2. Patient will complete a simple written task to increase verbal attention and memory (I.e. sample bill paying activity, recipe, or multiple choice comprehension questions to 1 paragraphs) with 80% accuracy and natural  "environment noise distractions (TV news background, music, etc.).   Progressing/ Not Met 12/13/2023   Not formally addressed     3. Patient will complete Goal-Plan-Action-Review with 90% accuracy independently.    Progressing/ Not Met 12/13/2023   Not formally addressed     4. Patient will complete tasks requiring divided attention and alternating attention with 90% accuracy given min cues to improve attention skills  Progressing/ Not Met 12/13/2023   Constant Therapy find alternating symbols level 5: 94% accuracy independently; strategy used: narration    Constant Therapy find alternating symbols level 6: 87% accuracy independently;  strategy used: narration    Constant Therapy find alternating words level 1: 97% accuracy given minimum to mod cues    5. Patient will complete moderate level problem solving tasks with 90% accuracy independently.       Progressing/ Not Met 12/13/2023   Patient completed problem solving task of scheduling appointments for a month. She was given a calendar and several appointments with available times and stipulations.   She completed this task with 100% accuracy with consistent moderate cueing - without cueing, patient with significant difficulty with initiation.    6. - Patient will immediately recall information from auditory stimuli using memory retrieval strategies with 90 % acc with min  cues to improve auditory recall.       Progressing/ Not Met 12/13/2023   Not formally addressed     7. Patient will create a list of socialization opportunities and create steps to initiate opportunity with min A from clinician.      Progressing/ Not Met 12/13/2023   Not formally addressed            Patient Education/Response:   Patient educated regarding the following:  Memory, cognitive, attention strategies   WRAP Write, Repeat, Associate, Picture - group of strategies for recall   Mental Rehearsal For "thinking through" your plan for the next day   Lists Create a list each night for the next " day   Sticky Notes Bright and with specific instructions   Alarms  For things you need to remember regularly (i.e. Meals)   Mental Review How did I do with my strategies today?   Goal  Plan  Action  Review Goal - what is my goal?  Plan - how will I do it?  Action - try to complete goal  Review- how did it go?      Home program established: Program modified based on patient progress  Patient verbalized understanding to all above education provided.     See Electronic Medical Record under Patient Instructions for exercises provided throughout therapy.  Assessment:   Patient with excellent participation and engagement however suspect poor carryover due to poor delayed recall. Patient frequently repeated herself throughout session with poor awareness of repeated information. Moderate cues required for problem solving tasks - poor initiation throughout task noted without cueing. Patient able to recall 3/4 memory strategies given WRAP mnemonic.      Patient prognosis is Fair. Patient will continue to benefit from skilled outpatient speech and language therapy to address the deficits listed in the problem list on initial evaluation, provide patient/family education and to maximize patient's level of independence in the home and community environment.   Medical necessity is demonstrated by the following IMPAIRMENTS:  Cognition: Deficits in executive functioning, attention, and memory prevent the pt from relaying medically and safety relevant information in a timely manner in a state of emergency.   Barriers to Therapy: none  Patient's spiritual, cultural and educational needs considered and patient agreeable to plan of care and goals.  Plan:   Continue Plan of Care with focus on rehabilitation and compensation for cognitive function in everyday tasks.      GLADIS Rodriguez, L-SLP, CCC-SLP  Speech Language Pathologist   12/13/2023

## 2023-12-19 ENCOUNTER — CLINICAL SUPPORT (OUTPATIENT)
Dept: REHABILITATION | Facility: HOSPITAL | Age: 68
End: 2023-12-19
Payer: MEDICARE

## 2023-12-19 DIAGNOSIS — R41.3 MEMORY CHANGE: Primary | ICD-10-CM

## 2023-12-19 PROCEDURE — 97130 THER IVNTJ EA ADDL 15 MIN: CPT | Mod: PO

## 2023-12-19 PROCEDURE — 97129 THER IVNTJ 1ST 15 MIN: CPT | Mod: PO

## 2023-12-21 ENCOUNTER — OUTPATIENT CASE MANAGEMENT (OUTPATIENT)
Dept: ADMINISTRATIVE | Facility: OTHER | Age: 68
End: 2023-12-21
Payer: MEDICARE

## 2023-12-29 NOTE — PROGRESS NOTES
Outpatient Care Management   - Low Risk Patient Assessment    Patient: Abbey Hendrickson  MRN:  [id number]  Date of Service:  12/29/2023  Completed by:  Jewels Crabtree LCSW  Referral Date: 12/19/2023    Reason for Visit   Patient presents with    Social Work Assessment - Low/Mod Risk    OPCM Enrollment Call       Brief Summary:  received a referral from [facility] therapists (Becca, MAYELIN) for the following patient identified psycho-social needs: Pt is having new memory issues and is having issues adjusting, just moved here and her son (Pt caregiver) needs support. Pt and Pt son elected to participate in the OPCM program. Social work assessment and SDOH completed. Pt and Pt son reported Pt lives with with him in a single story home and has just moved here from [place]. Pt was in a long term relationship but he left to move out of the country and she was living alone for almost a year. Pt son is concern about social drinking and Pt need for socialization while he is working. Pt utilizes no DME and is independent with ADLs as her issues are more short term memory related. Care plan created with caregiver input with the goal of setting up Pt with activities to get more socialization during the day. Emailed Pt son resources: [employer], [employer], [employer], [employer], [employer] Meals, Well dine, Long term care waiver, Adult day program waiver, [employer] free membership, and [employer] program.     Spoke with Jt with [facility] regarding this Pt. They were not aware that she had started SLP. They have updated the chart they have. Pt was established with Dr Arrington at [facility] but has not been seen since October. They would like a follow up appt at some point soon.     Future Appointments   Date Time Provider Department Center   1/2/2024  1:45 PM Becca Watt CF-SLP [facility]   1/9/2024  1:45 PM Becca Watt CF-SLP [facility]   1/16/2024   1:00 PM Becca Watt CF-SLP [facility]   1/23/2024  1:00 PM Becca Watt CF-MAYELIN MURDOCK [facility]   1/30/2024  1:00 [facility]     Jewels Crabtree [facility]  Neuro Therapy   [facility]  [phone]

## 2024-01-02 ENCOUNTER — OUTPATIENT CASE MANAGEMENT (OUTPATIENT)
Dept: ADMINISTRATIVE | Facility: OTHER | Age: 69
End: 2024-01-02
Payer: MEDICARE

## 2024-01-02 ENCOUNTER — CLINICAL SUPPORT (OUTPATIENT)
Dept: REHABILITATION | Facility: HOSPITAL | Age: 69
End: 2024-01-02
Payer: MEDICARE

## 2024-01-02 DIAGNOSIS — R41.3 MEMORY CHANGE: Primary | ICD-10-CM

## 2024-01-02 PROCEDURE — 97129 THER IVNTJ 1ST 15 MIN: CPT

## 2024-01-02 PROCEDURE — 97130 THER IVNTJ EA ADDL 15 MIN: CPT

## 2024-01-02 NOTE — PROGRESS NOTES
[facility]  Speech Therapy Treatment Note- Neurological Rehabilitation  Date: 1/2/2024     Name: Abbey Hendrickson   MRN: [id number]   Therapy Diagnosis:   Encounter Diagnosis   Name Primary?    Memory change Yes       Physician: Daniel Gatica MD  Physician Orders: Ambulatory Referral to Speech Therapy   Medical Diagnosis:   G31.84 (ICD-10-CM) - MCI (mild cognitive impairment)   F04 (ICD-10-CM) - Wernicke-Korsakoff syndrome       Visit # / Visits Authorized: 1/19  Date of Evaluation:  11/21/2023   Insurance Authorization Period: 11/14/2023 to 1/16/2024  Plan of Care Expiration Date:    1/2/2024 completed   Extended Plan of Care:  n/a   Progress Note: 12/21/2023 completed     Time In:  1345  Time Out: 1430  Total Billable Time: 45 minutes    Precautions: Standard and Cognition  Subjective:   Patient reports: she is motivated to participate in community activities .     She was compliant to home exercise program.   Response to previous treatment: good  Pain Scale: no pain indicated throughout session  Objective:     TIMED  Procedure Min.   Cognitive Therapeutic Interventions, first 15 minutes CPT 75552 15   Cognitive Therapeutic Interventions, each additional 15 minutes CPT 71664 25           Short Term Goals: (6 weeks) Current Progress:   Patient  will recall 4/4 memory strategies and give an example of implementation of each.    Progressing/ Not Met 1/2/2024   Reviewed WRAP at length (Write It, Repeat It, Associate It, Picture It)    Patient recalled 2/4 independently at beginning of session; patient recalled 3/4 given mnemonic at end of session.       2. Patient will complete a simple written task to increase verbal attention and memory (I.e. sample bill paying activity, recipe, or multiple choice comprehension questions to 1 paragraphs) with 80% accuracy and natural environment noise distractions (TV news background, music, etc.).   Progressing/ Not Met 1/2/2024   Not formally addressed        3.  "Patient will complete Goal-Plan-Action-Review with 90% accuracy independently.    Progressing/ Not Met 1/2/2024   Not formally addressed       4. Patient will complete tasks requiring divided attention and alternating attention with 90% accuracy given min cues to improve attention skills  Progressing/ Not Met 1/2/2024   Not formally addressed      5. Patient will complete moderate level problem solving tasks with 90% accuracy independently.       Progressing/ Not Met 1/2/2024   Not formally addressed      6. - Patient will immediately recall information from auditory stimuli using memory retrieval strategies with 90 % accuracy with min  cues to improve auditory recall.       Progressing/ Not Met 1/2/2024   Paragraphs with 3-4 Elements - Inclusion with 75% accuracy given moderate cues. Patient utilized repeat strategy to recall information.        7. Patient will create a list of socialization opportunities and create steps to initiate opportunity with min A from clinician.      Progressing/ Not Met 1/2/2024   Discussed Tajik talking groups in the area, including community groups and at the library.             Patient Education/Response:   Patient educated regarding the following:    WRAP Write, Repeat, Associate, Picture - group of strategies for recall   Mental Rehearsal For "thinking through" your plan for the next day   Lists Create a list each night for the next day   Sticky Notes Bright and with specific instructions   Alarms  For things you need to remember regularly (i.e. Meals)   Mental Review How did I do with my strategies today?   Goal  Plan  Action  Review Goal - what is my goal?  Plan - how will I do it?  Action - try to complete goal  Review- how did it go?        Word Retrieval Strategies:   Visualization: try to see the thing in your head. Concentrate on the details of the picture and sometimes the word will come  Association:  Think of things that go with the word. For example, bread goes with " "butter, so if you are trying to think of the word "butter", you may think of the word "bread".  Gesture: use the hand motion you would use with the thing you are thinking of. For example, if you are thinking of the word "wash", you might make a motion as if you are washing your hands.   Description:  Describe the thing to the other person you are talking to. Even if the word does not come to you, the other person may be able to guess what you are trying to say.   First Letter or Sound:  Try to think of the first letter of the word. Sometimes you can think of the first letter even if you cannot think of the word. The letter may "lead" you to the word. You might even try going down the alphabet to find the first letter.      Home program established: Program modified based on patient progress  Patient verbalized understanding to all above education provided.     See Electronic Medical Record under Patient Instructions for exercises provided throughout therapy.  Assessment:   Patient with excellent participation and engagement however suspect decreased carryover due to poor delayed recall. Patient frequently repeated herself throughout session with decreased awareness of repeated information. Required moderate cues during functional memory task. Reviewed external aids such as calendar use, Life Alert system when patient is alone at home and when walking in the neighborhood. Discussed involvement in the community. See updated  Plan of Care for family/patient education for another month.Patient and son will meet with  following this visit.     Patient prognosis is Fair. Patient will continue to benefit from skilled outpatient speech and language therapy to address the deficits listed in the problem list on initial evaluation, provide patient/family education and to maximize patient's level of independence in the home and community environment.   Medical necessity is demonstrated by the following " IMPAIRMENTS:  Cognition: Deficits in executive functioning, attention, and memory prevent the pt from relaying medically and safety relevant information in a timely manner in a state of emergency.   Barriers to Therapy: none  Patient's spiritual, cultural and educational needs considered and patient agreeable to plan of care and goals.  Plan:   Continue Plan of Care with focus on rehabilitation and compensation for cognitive function in everyday tasks.  Consider extension of Plan of Care for  patient/family education through January.    Recommendations: Referral to Case Management/ for family support.    GLADIS Crawford, CF-SLP  Speech-Language Pathology Resident   1/2/2024

## 2024-01-02 NOTE — PROGRESS NOTES
Outpatient Care Management   - Care Plan Follow Up    Patient: Abbey Hendrickson  MRN:  [id number]  Date of Service:  1/2/2024  Completed by:  Jewels Crabtree LCSW  Referral Date: 12/19/2023    Reason for Visit   Patient presents with    OPCM SW Follow Up Call       Brief Summary:  met with Pt and Pt son after their therapy visit today. Provided folder with some copies of resources found for Pt. She is interested in volunteer work specifically surrounding [place] Language communication. Some of the groups at the library are not meeting for awhile but she was interested in volunteering with the [facility]. [employer] did not have spots for the upcoming semester. She also reported she would check out the [facility] groups as membership there is also included with her insurance as is [facility].     Future Appointments   Date Time Provider Department Center   1/9/2024  1:45 PM Becca Watt CF-MAYELIN MATTHEWTH [facility]   1/16/2024  1:00 PM Becca Watt CF-MAYELIN MURDOCK [facility]   1/23/2024  1:00 PM Becca Watt CF-SLP [facility]   1/30/2024  1:00 PM Becca Watt CF-SLP [facility]     Jewels Crabtree LCSW  Neuro Therapy   [facility]  [phone]

## 2024-01-03 NOTE — PATIENT INSTRUCTIONS
"WRAP Write, Repeat, Associate, Picture - group of strategies for recall   Mental Rehearsal For "thinking through" your plan for the next day   Lists Create a list each night for the next day   Sticky Notes Bright and with specific instructions   Alarms  For things you need to remember regularly (i.e. Meals)   Mental Review How did I do with my strategies today?   Goal  Plan  Action  Review Goal - what is my goal?  Plan - how will I do it?  Action - try to complete goal  Review- how did it go?        Word Retrieval Strategies:   Visualization: try to see the thing in your head. Concentrate on the details of the picture and sometimes the word will come  Association:  Think of things that go with the word. For example, bread goes with butter, so if you are trying to think of the word "butter", you may think of the word "bread".  Gesture: use the hand motion you would use with the thing you are thinking of. For example, if you are thinking of the word "wash", you might make a motion as if you are washing your hands.   Description:  Describe the thing to the other person you are talking to. Even if the word does not come to you, the other person may be able to guess what you are trying to say.   First Letter or Sound:  Try to think of the first letter of the word. Sometimes you can think of the first letter even if you cannot think of the word. The letter may "lead" you to the word. You might even try going down the alphabet to find the first letter.    "

## 2024-01-03 NOTE — PLAN OF CARE
[facility]  Speech Therapy Updated Plan of Care-Neurological Rehabilitation         Date: 1/2/2024   Name: Abbey [facility] Number: [id number]    Therapy Diagnosis:   Encounter Diagnosis   Name Primary?    Memory change Yes     Physician: Daniel Gatica MD    Physician Orders: Ambulatory Referral to Speech Therapy   Medical Diagnosis:   G31.84 (ICD-10-CM) - MCI (mild cognitive impairment)   F04 (ICD-10-CM) - Wernicke-Korsakoff syndrome       Visit # / Visits Authorized: 1/19  Date of Evaluation:  11/21/2023   Insurance Authorization Period: 11/14/2023 to 1/16/2024  Plan of Care Expiration Date:    1/2/2024 completed   New POC Certification Period:  2/2/24    Total Visits Received: 8    Precautions:Standard and cognition       Subjective     Update: Patient has demonstrated progress since start of care. Patient has demonstrated improved use of strategies. Deficits continue to be observed in areas of memory. Skilled speech therapy services continue to be warranted to address cognitive communication deficits. Patient continues with excellent motivation to utilize learned strategies. Plan of Care moving forward to focus on family training and education.     Objective     Update: see follow up note dated 1/2/2024    Assessment     Update: Abbey presents to [facility] status post medical diagnosis of mild cognitive impairment and Wernicke-Korsakoff syndrome.     She presents with moderately impaired cognitive skills characterized by severely impaired memory and moderately impaired attention and visual spatial skills. She presented with mildly impaired executive functioning skills and language was within functional limits. She demonstrated weaknesses in selective attention, divided attention, auditory comprehension and recall, and problem solving skills with more complex information. She demonstrated strengths in generative naming and confrontation naming.      Demonstrates  impairments including limitations as described in the problem list.      Positive prognostic factors: family support   Negative prognostic factors: medical diagnosis  Barriers to therapy: No barriers to therapy identified.      Patient's spiritual, cultural, and educational needs considered and patient agreeable to plan of care and goals.  Patient will benefit from skilled therapy.     Rehab Potential: fair    Education: Plan of Care, role of SLP in care, and memory strategies     Previous Short Term Goals Status:    Short Term Goals: (6 weeks) Current Progress:   Patient  will recall 2/4 memory strategies given minimal cues. Revised 1/2/24    Progressing/ Not Met 1/2/2024   Revised 1/2/24      2. Patient will complete a simple written task to increase verbal attention and memory (I.e. sample bill paying activity, recipe, or multiple choice comprehension questions to 1 paragraphs) with 80% accuracy given minimal cues and natural environment noise distractions (TV news background, music, etc.). Revised 1/3/24    Progressing/ Not Met 1/2/2024   Revised 1/3/24       3. Patient will complete Goal-Plan-Action-Review with 80% accuracy given minimal cues. Revised 1/2/24    Progressing/ Not Met 1/2/2024   Revised 1/3/24      4. Patient will complete tasks requiring alternating attention with 80% accuracy given min cues to improve attention skills  Progressing/ Not Met 1/2/2024   Revised 1/3/24      5. Patient will complete moderate level problem solving tasks with 80% accuracy given minimal cues. Revised 1/3/24     Progressing/ Not Met 1/2/2024   Revised 1/3/24     6. - Patient will immediately recall information from auditory stimuli using memory retrieval strategies with 90 % accuracy with min  cues to improve auditory recall.       Progressing/ Not Met 1/2/2024   Progressing        7. Patient will create a list of socialization opportunities and create steps to initiate opportunity with min A from clinician.       Progressing/ Not Met 1/2/2024   Progressing          Long Term Goal Status:     Long Term Goals: (6 weeks) Current Progress:   She will use appropriate memory strategies to schedule and recall weekly activities, express needs and recall names to maintain safety and participate socially in functional living environment.     Progressing      2. She  will demonstrate improved problem solving skills and provide appropriate solutions to problems in order to improve safety and awareness in functional living environment.      Progressing      3. She  will maintain functional cognitive-linguistic based skills and utilize compensatory strategies to communicate wants and needs effectively to different conversational partners, maintain safety, and participate socially in functional living environment.      Progressing        4. Pt will develop functional, cognitive-linguistic-based skills an utilize compensatory strategies to communicate wants and needs effectively, maintain safety during ADL's and participate socially in functional living environment.   New Goal 1/2/24          Reasons for Recertification of Therapy:   Cognition: Deficits in executive functioning, attention, and memory prevent the pt from relaying medically and safety relevant information in a timely manner in a state of emergency.      Plan     Updated Certification Period: 1/2/2024 to 2/2/24    Recommended Treatment Plan: Patient will participate in the [facility] program for speech therapy 1 times per week to address her Communication and Cognition deficits, to educate patient and their family, and to participate in a home exercise program.     Other recommendations: Continue with Case Management/ for family support      Therapist's Name:  GLADIS Crawford, CF-SLP  Speech-Language Pathology Resident   1/2/2024      I CERTIFY THE NEED FOR THESE SERVICES FURNISHED UNDER THIS PLAN OF TREATMENT AND WHILE UNDER MY  CARE      Physician Name: _______________________________    Physician Signature: ____________________________

## 2024-01-08 NOTE — PROGRESS NOTES
[facility]  Speech Therapy Treatment Note- Neurological Rehabilitation  Date: 1/9/2024     Name: Abbey Hendrickson   MRN: [id number]   Therapy Diagnosis:   Encounter Diagnosis   Name Primary?    Memory change Yes         Physician: Daniel Gatica MD  Physician Orders: Ambulatory Referral to Speech Therapy   Medical Diagnosis:   G31.84 (ICD-10-CM) - MCI (mild cognitive impairment)   F04 (ICD-10-CM) - Wernicke-Korsakoff syndrome       Visit # / Visits Authorized: 2/19  Date of Evaluation:  11/21/2023   Insurance Authorization Period: 11/14/2023 to 1/16/2024  Plan of Care Expiration Date:    1/2/2024 completed   Extended Plan of Care:  2/2/24  Progress Note: 1/26/24    Time In:  1357 (patient arrived late)   Time Out: 1430  Total Billable Time: 28 minutes     Precautions: Standard and Cognition  Subjective:   Patient reports: Patient has been walking and looking to get involved. Looking through resources. Eye allergies today.  Patient's son reported wanting support organizing the day; making a schedule.     She was compliant to home exercise program.   Response to previous treatment: good  Pain Scale: no pain indicated throughout session  Objective:     TIMED  Procedure Min.   Cognitive Therapeutic Interventions, first 15 minutes CPT 54283 15   Cognitive Therapeutic Interventions, each additional 15 minutes CPT 25994 13         Short Term Goals: (6 weeks) Current Progress:   Patient  will recall 4/4 memory strategies and give an example of implementation of each.    Progressing/ Not Met 1/9/2024   Not formally addressed        2. Patient will complete a simple written task to increase verbal attention and memory (I.e. sample bill paying activity, recipe, or multiple choice comprehension questions to 1 paragraphs) with 80% accuracy and natural environment noise distractions (TV news background, music, etc.).   Progressing/ Not Met 1/9/2024   Completed daily planner with 90% accuracy given minimal cues.  "       3. Patient will complete Goal-Plan-Action-Review with 90% accuracy independently.    Progressing/ Not Met 1/9/2024   Patient has goal and plan to implement daily planner.      4. Patient will complete tasks requiring divided attention and alternating attention with 90% accuracy given min cues to improve attention skills  Progressing/ Not Met 1/9/2024   Completed daily planner with 90% accuracy given minimal cues.     Met 1/3      5. Patient will complete moderate level problem solving tasks with 90% accuracy independently.       Progressing/ Not Met 1/9/2024   Completed daily planner with 90% accuracy given minimal cues.     Met 1/3      6. - Patient will immediately recall information from auditory stimuli using memory retrieval strategies with 90 % accuracy with min  cues to improve auditory recall.       Progressing/ Not Met 1/9/2024   Not formally addressed        7. Patient will create a list of socialization opportunities and create steps to initiate opportunity with min A from clinician.      Progressing/ Not Met 1/9/2024   Patient's daily planner includes time to research socialization opportunities, given minimal cues.     Met 1/3           Patient Education/Response:   Patient educated regarding the following:    WRAP Write, Repeat, Associate, Picture - group of strategies for recall   Mental Rehearsal For "thinking through" your plan for the next day   Lists Create a list each night for the next day   Sticky Notes Bright and with specific instructions   Alarms  For things you need to remember regularly (i.e. Meals)   Mental Review How did I do with my strategies today?   Goal  Plan  Action  Review Goal - what is my goal?  Plan - how will I do it?  Action - try to complete goal  Review- how did it go?        Word Retrieval Strategies:   Visualization: try to see the thing in your head. Concentrate on the details of the picture and sometimes the word will come  Association:  Think of things that go " "with the word. For example, bread goes with butter, so if you are trying to think of the word "butter", you may think of the word "bread".  Gesture: use the hand motion you would use with the thing you are thinking of. For example, if you are thinking of the word "wash", you might make a motion as if you are washing your hands.   Description:  Describe the thing to the other person you are talking to. Even if the word does not come to you, the other person may be able to guess what you are trying to say.   First Letter or Sound:  Try to think of the first letter of the word. Sometimes you can think of the first letter even if you cannot think of the word. The letter may "lead" you to the word. You might even try going down the alphabet to find the first letter.      Home program established: Program modified based on patient progress  Patient verbalized understanding to all above education provided.     See Electronic Medical Record under Patient Instructions for exercises provided throughout therapy.  Assessment:   Patient and son with excellent participation and engagement. Introduction and education about daily planner and implementing schedule of tasks.  Patient and son discussed including fixing bed, washing dishes, organizing papers, walking every day, [facility], hygiene, meals, planning or looking into activities and volunteering, medication (vitamin) management. Also discussed utilizing notebook for memory support rather than various papers. Patient frequently repeated herself throughout session with decreased awareness of repeated information. Reviewed external aids such as calendar use, Life Alert system when patient is alone at home and when walking in the neighborhood. Discussed involvement in the community.     Patient prognosis is Fair. Patient will continue to benefit from skilled outpatient speech and language therapy to address the deficits listed in the problem list on initial " evaluation, provide patient/family education and to maximize patient's level of independence in the home and community environment.   Medical necessity is demonstrated by the following IMPAIRMENTS:  Cognition: Deficits in executive functioning, attention, and memory prevent the pt from relaying medically and safety relevant information in a timely manner in a state of emergency.   Barriers to Therapy: none  Patient's spiritual, cultural and educational needs considered and patient agreeable to plan of care and goals.  Plan:   Continue Plan of Care with focus on rehabilitation and compensation for cognitive function in everyday tasks.  Consider extension of Plan of Care for  patient/family education through January.    Recommendations: Referral to Case Management/ for family support.    GLADIS Crawford, CF-SLP  Speech-Language Pathology Resident   1/9/2024

## 2024-01-09 ENCOUNTER — OUTPATIENT CASE MANAGEMENT (OUTPATIENT)
Dept: ADMINISTRATIVE | Facility: OTHER | Age: 69
End: 2024-01-09
Payer: MEDICARE

## 2024-01-09 ENCOUNTER — CLINICAL SUPPORT (OUTPATIENT)
Dept: REHABILITATION | Facility: HOSPITAL | Age: 69
End: 2024-01-09
Payer: MEDICARE

## 2024-01-09 DIAGNOSIS — R41.3 MEMORY CHANGE: Primary | ICD-10-CM

## 2024-01-09 PROCEDURE — 97129 THER IVNTJ 1ST 15 MIN: CPT

## 2024-01-09 PROCEDURE — 97130 THER IVNTJ EA ADDL 15 MIN: CPT

## 2024-01-09 NOTE — PROGRESS NOTES
Outpatient Care Management   - Care Plan Follow Up    Patient: Abbey Hendrickson  MRN:  [id number]  Date of Service:  1/9/2024  Completed by:  Jewels Crabtree LCSW  Referral Date: 12/19/2023    Reason for Visit   Patient presents with    OPCM SW Follow Up Call       Brief Summary:  met with Pt and Pt son after their therapy visit today. Pt reported they reseached all the resources but have not reached out to set anything up yet. They will do so this week.     Future Appointments   Date Time Provider Department Center   1/16/2024  1:00 PM Becca Watt [place] [address] [facility]   1/23/2024  1:00 PM Becca Watt [facility]   1/30/2024  [facility] [place] [address] [facility]     Jewels Crabtree LCSW  Neuro Therapy   [facility]  [phone]       knee/back

## 2024-01-23 ENCOUNTER — OUTPATIENT CASE MANAGEMENT (OUTPATIENT)
Dept: ADMINISTRATIVE | Facility: OTHER | Age: 69
End: 2024-01-23
Payer: MEDICARE

## 2024-01-23 ENCOUNTER — CLINICAL SUPPORT (OUTPATIENT)
Dept: REHABILITATION | Facility: HOSPITAL | Age: 69
End: 2024-01-23
Payer: MEDICARE

## 2024-01-23 DIAGNOSIS — R41.3 MEMORY CHANGE: Primary | ICD-10-CM

## 2024-01-23 PROCEDURE — 97130 THER IVNTJ EA ADDL 15 MIN: CPT

## 2024-01-23 PROCEDURE — 97129 THER IVNTJ 1ST 15 MIN: CPT

## 2024-01-23 NOTE — PROGRESS NOTES
Outpatient Care Management   - Care Plan Follow Up    Patient: Abbey Hendrickson  MRN:  [id number]  Date of Service:  1/23/2024  Completed by:  Jewels Crabtree LCSW  Referral Date: 12/19/2023    Reason for Visit   Patient presents with    OPCM SW Follow Up Call       Brief Summary:  met with Pt and Pt son after their therapy visit today. Pt and Pt son reported they reseached all the resources and reached out to [facility]. A packet was sent of paperwork they need to complete to apply for the programs. Discussed new resource, [employer][email or web address]) as possibility. They plan to look into other options this week.     Future Appointments   Date Time Provider Department Center   1/30/2024  1:00 PM Becca Watt, CF-SLP [address] PT     Jewels Crabtree LCSW  Neuro Therapy   [facility]  [phone]

## 2024-01-30 ENCOUNTER — OUTPATIENT CASE MANAGEMENT (OUTPATIENT)
Dept: ADMINISTRATIVE | Facility: OTHER | Age: 69
End: 2024-01-30
Payer: MEDICARE

## 2024-01-30 ENCOUNTER — CLINICAL SUPPORT (OUTPATIENT)
Dept: REHABILITATION | Facility: HOSPITAL | Age: 69
End: 2024-01-30
Payer: MEDICARE

## 2024-01-30 DIAGNOSIS — R41.3 MEMORY CHANGE: Primary | ICD-10-CM

## 2024-01-30 PROCEDURE — 97130 THER IVNTJ EA ADDL 15 MIN: CPT

## 2024-01-30 PROCEDURE — 97129 THER IVNTJ 1ST 15 MIN: CPT

## 2024-01-30 NOTE — PROGRESS NOTES
[facility]  Speech Therapy Treatment Note- Neurological Rehabilitation  Date: 1/30/2024     Name: Abbey Hendrickson   MRN: [id number]   Therapy Diagnosis:   Encounter Diagnosis   Name Primary?    Memory change Yes         Physician: Daniel Gatica MD  Physician Orders: Ambulatory Referral to Speech Therapy   Medical Diagnosis:   G31.84 (ICD-10-CM) - MCI (mild cognitive impairment)   F04 (ICD-10-CM) - Wernicke-Korsakoff syndrome       Visit # / Visits Authorized: 4/19  Date of Evaluation:  11/21/2023   Insurance Authorization Period: 11/14/2023 to 1/16/2024  Plan of Care Expiration Date:    1/2/2024 completed   Extended Plan of Care:  2/2/24   Progress Note: 1/26/24    Time In:  1310 (patient arrived late)   Time Out: 1345  Total Billable Time: 35 minutes     Precautions: Standard and Cognition  Subjective:   Patient reports: She went to the dentist and is sleepy. Got a call from [employer], found 3-4 volunteer organizations, and downloaded a [employer].     She was compliant to home exercise program.   Response to previous treatment: good  Pain Scale: no pain indicated throughout session  Objective:     TIMED  Procedure Min.   Cognitive Therapeutic Interventions, first 15 minutes CPT 69025 15   Cognitive Therapeutic Interventions, each additional 15 minutes CPT 17071 20         Short Term Goals: (6 weeks) Current Progress:   Patient  will recall 4/4 memory strategies and give an example of implementation of each.    Progressing/ Not Met 1/30/2024   Discontinue/Not met        2. Patient will complete a simple written task to increase verbal attention and memory (I.e. sample bill paying activity, recipe, or multiple choice comprehension questions to 1 paragraphs) with 80% accuracy and natural environment noise distractions (TV news background, music, etc.).     Progressing/ Not Met 1/30/2024   Read a calendar, level 2 with 90% given minimal cues; 80% accuracy independently     Met x 2         3. Patient will  "complete Goal-Plan-Action-Review with 90% accuracy given minimal cues. Revised 1/23/24      Progressing/ Not Met 1/30/2024   Patient Goal: research groups and activities to join  Plan: planned time and utilized daily plan to complete task given minimal cues   Action: Patient found several options   Review: utilized structured time in daily planner schedule      Met x 2      4. Patient will complete tasks requiring divided attention and alternating attention with 90% accuracy given min cues to improve attention skills    Progressing/ Not Met 1/30/2024   Find alternating symbols, level 2 with  96% accuracy given minimal cues     Met x 3      5. Patient will complete moderate level problem solving tasks with 90% accuracy independently.       Progressing/ Not Met 1/30/2024   Read everyday things, Level 2 with 90% accuracy given minimal cues     Met x 2     6. - Patient will immediately recall information from auditory stimuli using memory retrieval strategies with 90 % accuracy with min  cues to improve auditory recall.       Progressing/ Not Met 1/30/2024   Understand voicemail, level 1 with 90% accuracy given minimal cues     Met x 1       7. Patient will create a list of socialization opportunities and create steps to initiate opportunity with min A from clinician.      Progressing/ Not Met 1/30/2024   Patient's daily planner includes time to research socialization opportunities, given minimal cues.     Met x 3            Patient Education/Response:   Patient educated regarding the following:    WRAP Write, Repeat, Associate, Picture - group of strategies for recall   Mental Rehearsal For "thinking through" your plan for the next day   Lists Create a list each night for the next day   Sticky Notes Bright and with specific instructions   Alarms  For things you need to remember regularly (i.e. Meals)   Mental Review How did I do with my strategies today?   Goal  Plan  Action  Review Goal - what is my goal?  Plan - how " "will I do it?  Action - try to complete goal  Review- how did it go?        Word Retrieval Strategies:   Visualization: try to see the thing in your head. Concentrate on the details of the picture and sometimes the word will come  Association:  Think of things that go with the word. For example, bread goes with butter, so if you are trying to think of the word "butter", you may think of the word "bread".  Gesture: use the hand motion you would use with the thing you are thinking of. For example, if you are thinking of the word "wash", you might make a motion as if you are washing your hands.   Description:  Describe the thing to the other person you are talking to. Even if the word does not come to you, the other person may be able to guess what you are trying to say.   First Letter or Sound:  Try to think of the first letter of the word. Sometimes you can think of the first letter even if you cannot think of the word. The letter may "lead" you to the word. You might even try going down the alphabet to find the first letter.      Home program established: Program modified based on patient progress  Patient verbalized understanding to all above education provided.     See Electronic Medical Record under Patient Instructions for exercises provided throughout therapy.  Assessment:   Patient and son with excellent participation and engagement. Continued discussion about daily planner and implementing schedule of tasks.  Discussed utilizing notebook for memory support rather than various papers. Patient frequently repeated herself throughout session with decreased awareness of repeated information. Reviewed external aids such as calendar use, system when patient is alone at home and when walking in the neighborhood. Discussed involvement in the community.  Overall, patient continues with good progress towards established goals and continues to benefit from ongoing skilled ST services. See discharge note.     Patient " prognosis is Fair. Patient will continue to benefit from skilled outpatient speech and language therapy to address the deficits listed in the problem list on initial evaluation, provide patient/family education and to maximize patient's level of independence in the home and community environment.   Medical necessity is demonstrated by the following IMPAIRMENTS:  Cognition: Deficits in executive functioning, attention, and memory prevent the pt from relaying medically and safety relevant information in a timely manner in a state of emergency.   Barriers to Therapy: none  Patient's spiritual, cultural and educational needs considered and patient agreeable to plan of care and goals.  Plan:   See discharge note.     Recommendations: Follow up with Speech Therapy in 3 months as needed     GLADIS Crawford, CF-SLP  Speech-Language Pathology Resident   1/30/2024

## 2024-01-30 NOTE — PROGRESS NOTES
Outpatient Care Management   - Care Plan Follow Up    Patient: Abbey Hendrickson  MRN:  [id number]  Date of Service:  1/30/2024  Completed by:  Jewels Crabtree LCSW  Referral Date: 12/19/2023    Reason for Visit   Patient presents with    OPCM SW Follow Up Call       Brief Summary:  met with Pt and Pt son after their therapy visit today. Pt son reported they called [employer] and are set to tour on Thursday. 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 Pt son has advised that he has been able to set up several things over the week for her to do. Pt advised she is looking forward to being more involved with the community. SW to check in in two weeks to see if any other additional resources are needed.    No future appointments.    Jewels Crabtree LCSW  Neuro Therapy   [facility]  [phone]

## 2024-01-30 NOTE — PLAN OF CARE
Outpatient Therapy Discharge Summary   Discharge Date: 1/30/2024   Name: Abbey Hendrickson  [facility] Number: [id number]  Therapy Diagnosis:   Encounter Diagnosis   Name Primary?    Memory change Yes     Physician: Daniel Gatica MD  Physician Orders: Ambulatory Referral to Speech Therapy   Medical Diagnosis:   G31.84 (ICD-10-CM) - MCI (mild cognitive impairment)   F04 (ICD-10-CM) - Wernicke-Korsakoff syndrome       Visit # / Visits Authorized: 4/19 (plus 5, plus evaluation)   Date of Evaluation:  11/21/2023       Assessment    Assessment of Current Status: Patient and son with excellent participation and engagement in Speech Therapy and to continue using strategies. Patient has met all of his/her goals and Patient has reached the maximum rehab potential for the present time. Patient plans to participate in socialization and volunteer opportunities to use skills and strategies.      Short Term Goals: (6 weeks) Current Progress:   Patient  will recall 4/4 memory strategies and give an example of implementation of each.    Progressing/ Not Met 1/30/2024   Discontinue/Not met        2. Patient will complete a simple written task to increase verbal attention and memory (I.e. sample bill paying activity, recipe, or multiple choice comprehension questions to 1 paragraphs) with 80% accuracy and natural environment noise distractions (TV news background, music, etc.).     Progressing/ Not Met 1/30/2024   Goal Met   3. Patient will complete Goal-Plan-Action-Review with 90% accuracy given minimal cues. Revised 1/23/24      Progressing/ Not Met 1/30/2024   Goal Met   4. Patient will complete tasks requiring divided attention and alternating attention with 90% accuracy given min cues to improve attention skills    Progressing/ Not Met 1/30/2024   Goal Met   5. Patient will complete moderate level problem solving tasks with 90% accuracy independently.       Progressing/ Not Met 1/30/2024   Goal Met   6. - Patient will immediately  recall information from auditory stimuli using memory retrieval strategies with 90 % accuracy with min  cues to improve auditory recall.       Progressing/ Not Met 1/30/2024   Goal Met   7. Patient will create a list of socialization opportunities and create steps to initiate opportunity with min A from clinician.      Progressing/ Not Met 1/30/2024   Goal Met       Long Term Goals: (6 weeks) Current Progress:   She will use appropriate memory strategies to schedule and recall weekly activities, express needs and recall names to maintain safety and participate socially in functional living environment.     Established this date     2. She  will demonstrate improved problem solving skills and provide appropriate solutions to problems in order to improve safety and awareness in functional living environment.      Established this date     3. She  will maintain functional cognitive-linguistic based skills and utilize compensatory strategies to communicate wants and needs effectively to different conversational partners, maintain safety, and participate socially in functional living environment.      Established this date          Discharge reason: Patient has met all of his/her goals and Patient has reached the maximum rehab potential for the present time    Plan   This patient is discharged from Speech Therapy      Recommendations: Follow up with Speech Therapy in 3 months as needed     GLADIS Crawfodr, CF-SLP  Speech-Language Pathology Resident   1/30/2024

## 2024-02-20 ENCOUNTER — OUTPATIENT CASE MANAGEMENT (OUTPATIENT)
Dept: ADMINISTRATIVE | Facility: OTHER | Age: 69
End: 2024-02-20
Payer: MEDICARE

## 2024-02-22 NOTE — PROGRESS NOTES
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 Pt reported she is doing well. Has not started volunteering yet but they have called a few places. Discussed [place] again and provided website info/phone number. Pt reported she will look at this option today.     No future appointments.    Jewels Crabtree LCSW  Neuro Therapy   [facility]  [phone]

## 2024-03-13 ENCOUNTER — OUTPATIENT CASE MANAGEMENT (OUTPATIENT)
Dept: ADMINISTRATIVE | Facility: OTHER | Age: 69
End: 2024-03-13
Payer: MEDICARE

## 2024-03-18 NOTE — PROGRESS NOTES
Outpatient Care Management   - Care Plan Follow Up    Patient: Abbey Hendrickson  MRN:  [id number]  Date of Service:  3/18/2024  Completed by:  Jewels Crabtree LCSW  Referral Date: 12/19/2023    Reason for Visit   Patient presents with    Other     3/13/2024  1st attempt to complete Follow-Up  for Outpatient Care Management, left message.      OPCM  Follow Up Call    Case Closure     3/18/24       Brief Summary:  contacted Pt to check in and she reported she is doing well. She does a lot of walking and has not been going out to drink as much. She advised she has not started volunteering yet but they have called a few places and then she took a break from finding something. Discussed that email was sent to her son and she requested the email of resources be sent to her and she reported she will look at these options today. Discussed closing out the case since she does not have further needs, and how she can reach out should any other needs arise. Pt agreeable to this.   INTERVENTIONS: Emailed ([email or web address]) forwarded to her of resources and added several links for [employer], [employer], [employer], and [facility] senior centers. Previously provided folder with printed resources for volunteer work in various community centers.     No future appointments.    Jewels Crabtree LCSW  Neuro Therapy   [facility]  [phone]

## 2024-10-01 ENCOUNTER — OFFICE VISIT (OUTPATIENT)
Dept: URGENT CARE | Facility: CLINIC | Age: 69
End: 2024-10-01
Payer: MEDICARE

## 2024-10-01 VITALS
DIASTOLIC BLOOD PRESSURE: 86 MMHG | WEIGHT: 114 LBS | SYSTOLIC BLOOD PRESSURE: 132 MMHG | BODY MASS INDEX: 19.57 KG/M2 | RESPIRATION RATE: 17 BRPM | OXYGEN SATURATION: 98 % | TEMPERATURE: 99 F | HEART RATE: 105 BPM

## 2024-10-01 DIAGNOSIS — Z20.822 CLOSE EXPOSURE TO COVID-19 VIRUS: ICD-10-CM

## 2024-10-01 DIAGNOSIS — R07.9 CHEST PAIN, UNSPECIFIED TYPE: ICD-10-CM

## 2024-10-01 DIAGNOSIS — U07.1 COVID-19: Primary | ICD-10-CM

## 2024-10-01 DIAGNOSIS — U07.1 COVID-19 VIRUS DETECTED: ICD-10-CM

## 2024-10-01 LAB
CTP QC/QA: YES
SARS-COV-2 AG RESP QL IA.RAPID: POSITIVE

## 2024-10-01 PROCEDURE — 87811 SARS-COV-2 COVID19 W/OPTIC: CPT | Mod: QW,S$GLB,, | Performed by: NURSE PRACTITIONER

## 2024-10-01 PROCEDURE — 99204 OFFICE O/P NEW MOD 45 MIN: CPT | Mod: S$GLB,,, | Performed by: NURSE PRACTITIONER

## 2024-10-01 PROCEDURE — 93005 ELECTROCARDIOGRAM TRACING: CPT | Mod: S$GLB,,, | Performed by: NURSE PRACTITIONER

## 2024-10-01 PROCEDURE — 93010 ELECTROCARDIOGRAM REPORT: CPT | Mod: S$GLB,,, | Performed by: INTERNAL MEDICINE

## 2024-10-01 RX ORDER — FLUTICASONE PROPIONATE 50 MCG
1 SPRAY, SUSPENSION (ML) NASAL DAILY
Qty: 18.2 ML | Refills: 0 | Status: SHIPPED | OUTPATIENT
Start: 2024-10-01 | End: 2024-10-08

## 2024-10-01 RX ORDER — LORATADINE 10 MG/1
10 TABLET ORAL DAILY
Qty: 7 TABLET | Refills: 0 | Status: SHIPPED | OUTPATIENT
Start: 2024-10-01 | End: 2024-10-08

## 2024-10-01 NOTE — PATIENT INSTRUCTIONS
You may call [phone] to schedule appointment with Cardiology    The CDC has changed their approach to COVID isolation to be in line with other respiratory viruses.         If you test positive for COVID-19 you may return to normal activities when, for at least 24 hours, both are true:     Your symptoms are getting better overall, and:  You have not had a fever AND are not using fever reducing medication    If you are ill with COVID-19, wear a mask for 10 days, where day 1 is when symptoms started. If you tested positive, but never had symptoms, day 1 is the date of positive test.     The CDC also recommends added precautions in the 5 days after return to normal activity including frequent hand washing, mask wearing, physical distancing.

## 2024-10-01 NOTE — PROGRESS NOTES
Subjective:      Patient ID: Abbey Hendrickson is a 69 y.o. female.    Vitals:  weight is 51.7 kg (114 lb). Her oral temperature is 99.2 °F (37.3 °C). Her blood pressure is 132/86 and her pulse is 105. Her respiration is 17 and oxygen saturation is 98%.     Chief Complaint: Chest Pain    Pt states she is coming in for chest pains that started half an hour ago, states that the pain is sharp and it is bad.  Provider note begins below    Patient states the chest pain was approximately an hour ago.  It lasted for 20 minutes.  It was sharp in the middle of her chest sal up to her esophagus and spread out behind her breast.  She has also been exposed to COVID.  Does have a runny nose and congestion.    Chest Pain   This is a new problem. The current episode started today. The onset quality is sudden. The problem has been unchanged. The pain is at a severity of 8/10. The pain is severe. The quality of the pain is described as sharp. Pertinent negatives include no cough, diaphoresis, fever, nausea or vomiting.       Constitution: Negative for sweating, fatigue and fever.   HENT:  Positive for congestion and postnasal drip.    Cardiovascular:  Positive for chest pain. Negative for sob on exertion.   Respiratory:  Negative for cough.    Gastrointestinal:  Negative for nausea, vomiting, constipation and diarrhea.      Objective:     Physical Exam   Constitutional: She is oriented to person, place, and time.   HENT:   Head: Normocephalic and atraumatic.   Cardiovascular: Normal rate.   Pulmonary/Chest: Effort normal. No respiratory distress.   Abdominal: Normal appearance.   Neurological: She is alert and oriented to person, place, and time.   Skin: Skin is warm and dry.   Psychiatric: Her behavior is normal. Mood normal.         EKG: sinus tachycardia.     Results for orders placed or performed in visit on 10/01/24   SARS Coronavirus 2 Antigen, POCT Manual Read    Collection Time: 10/01/24  5:48 PM   Result Value Ref Range     SARS Coronavirus 2 Antigen Positive (A) Negative     Acceptable Yes       Assessment:     1. COVID-19    2. Chest pain, unspecified type    3. Close exposure to COVID-19 virus        Plan:   ED precautions   COVID test positive   EKG normal   Referral to Cardiology.    COVID risk score    2         The CDC has changed their approach to COVID isolation to be in line with other respiratory viruses.         If you test positive for COVID-19 you may return to normal activities when, for at least 24 hours, both are true:     Your symptoms are getting better overall, and:  You have not had a fever AND are not using fever reducing medication    If you are ill with COVID-19, wear a mask for 10 days, where day 1 is when symptoms started. If you tested positive, but never had symptoms, day 1 is the date of positive test.     The CDC also recommends added precautions in the 5 days after return to normal activity including frequent hand washing, mask wearing, physical distancing.           COVID-19  -     Ambulatory referral/consult to Cardiology  -     fluticasone propionate (FLONASE) 50 mcg/actuation nasal spray; 1 spray (50 mcg total) by Each Nostril route once daily. for 7 days  Dispense: 18.2 mL; Refill: 0  -     loratadine (CLARITIN) 10 mg tablet; Take 1 tablet (10 mg total) by mouth once daily. for 7 days  Dispense: 7 tablet; Refill: 0  -     nirmatrelvir-ritonavir 300 mg (150 mg x 2)-100 mg copackaged tablets (EUA); Take 3 tablets by mouth 2 (two) times daily for 5 days. Each dose contains 2 nirmatrelvir (pink tablets) and 1 ritonavir (white tablet). Take all 3 tablets together  Dispense: 30 tablet; Refill: 0    Chest pain, unspecified type  -     EKG 12-lead; Future  -     SARS Coronavirus 2 Antigen, POCT Manual Read    Close exposure to COVID-19 virus  -     SARS Coronavirus 2 Antigen, POCT Manual Read

## 2024-10-02 LAB
OHS QRS DURATION: 96 MS
OHS QTC CALCULATION: 433 MS

## 2024-11-05 ENCOUNTER — OFFICE VISIT (OUTPATIENT)
Dept: CARDIOLOGY | Facility: CLINIC | Age: 69
End: 2024-11-05
Payer: MEDICARE

## 2024-11-05 VITALS
DIASTOLIC BLOOD PRESSURE: 72 MMHG | BODY MASS INDEX: 20.13 KG/M2 | HEART RATE: 73 BPM | WEIGHT: 117.31 LBS | SYSTOLIC BLOOD PRESSURE: 119 MMHG

## 2024-11-05 DIAGNOSIS — R07.2 PRECORDIAL CHEST PAIN: Primary | ICD-10-CM

## 2024-11-05 PROCEDURE — 1126F AMNT PAIN NOTED NONE PRSNT: CPT | Mod: CPTII,S$GLB,, | Performed by: INTERNAL MEDICINE

## 2024-11-05 PROCEDURE — 99999 PR PBB SHADOW E&M-EST. PATIENT-LVL II: CPT | Mod: PBBFAC,,, | Performed by: INTERNAL MEDICINE

## 2024-11-05 PROCEDURE — 3288F FALL RISK ASSESSMENT DOCD: CPT | Mod: CPTII,S$GLB,, | Performed by: INTERNAL MEDICINE

## 2024-11-05 PROCEDURE — 99203 OFFICE O/P NEW LOW 30 MIN: CPT | Mod: S$GLB,,, | Performed by: INTERNAL MEDICINE

## 2024-11-05 PROCEDURE — 3078F DIAST BP <80 MM HG: CPT | Mod: CPTII,S$GLB,, | Performed by: INTERNAL MEDICINE

## 2024-11-05 PROCEDURE — 1101F PT FALLS ASSESS-DOCD LE1/YR: CPT | Mod: CPTII,S$GLB,, | Performed by: INTERNAL MEDICINE

## 2024-11-05 PROCEDURE — 3074F SYST BP LT 130 MM HG: CPT | Mod: CPTII,S$GLB,, | Performed by: INTERNAL MEDICINE

## 2024-11-05 PROCEDURE — 3008F BODY MASS INDEX DOCD: CPT | Mod: CPTII,S$GLB,, | Performed by: INTERNAL MEDICINE

## 2024-11-05 PROCEDURE — 1159F MED LIST DOCD IN RCRD: CPT | Mod: CPTII,S$GLB,, | Performed by: INTERNAL MEDICINE

## 2024-11-05 NOTE — PROGRESS NOTES
Subjective:   11/05/2024     Patient ID:  Abbey Hendrickson is a 69 y.o. female who presents for evaulation of Chest Pain      Patient usually followed at [facility] who had an episode of chest pain a month ago, she was found have COVID at that time.  The chest pain is seemingly was felt to be atypical then.  She was seen in urgent care.  She has not had exertional chest pains or tightness, no PND or orthopnea since then.    She does not have a history of heart problems.  She does not have hypertension or diabetes.  She smoked only in her youth.      EKG from October 1, 2024 shows sinus tachycardia and is otherwise unremarkable.        No past medical history on file.    Review of patient's allergies indicates:  No Known Allergies      Current Outpatient Medications:     cyanocobalamin, vitamin B-12, 5,000 mcg Subl, Place one under tongue once a day for 1 month, then continue to take under tongue per week, Disp: 30 tablet, Rfl: 3    loratadine (CLARITIN) 10 mg tablet, Take 1 tablet (10 mg total) by mouth once daily. for 7 days, Disp: 7 tablet, Rfl: 0    thiamine 250 MG tablet, Take 1 tablet (250 mg total) by mouth once daily. (Patient not taking: Reported on 11/5/2024), Disp: 30 tablet, Rfl: 5     Objective:   Review of Systems   Cardiovascular:  Positive for chest pain. Negative for claudication, cyanosis, dyspnea on exertion, irregular heartbeat, leg swelling, near-syncope, orthopnea, palpitations, paroxysmal nocturnal dyspnea and syncope.         Vitals:    11/05/24 1152   BP: 119/72   Pulse: 73     Wt Readings from Last 3 Encounters:   11/05/24 53.2 kg (117 lb 4.6 oz)   10/01/24 51.7 kg (114 lb)   09/21/23 52 kg (114 lb 10.2 oz)     Temp Readings from Last 3 Encounters:   10/01/24 99.2 °F (37.3 °C) (Oral)     BP Readings from Last 3 Encounters:   11/05/24 119/72   10/01/24 132/86   09/21/23 135/82     Pulse Readings from Last 3 Encounters:   11/05/24 73   10/01/24 105   09/21/23 78             Physical Exam  Vitals  "reviewed.   Constitutional:       General: She is not in acute distress.     Appearance: She is well-developed.   HENT:      Head: Normocephalic and atraumatic.      Nose: Nose normal.   Eyes:      Conjunctiva/sclera: Conjunctivae normal.      Pupils: Pupils are equal, round, and reactive to light.   Neck:      Vascular: No carotid bruit or JVD.   Cardiovascular:      Rate and Rhythm: Normal rate and regular rhythm.      Pulses: Normal pulses and intact distal pulses.      Heart sounds: Normal heart sounds. No murmur heard.     No friction rub. No gallop.   Pulmonary:      Effort: Pulmonary effort is normal. No respiratory distress.      Breath sounds: Normal breath sounds. No wheezing or rales.   Chest:      Chest wall: No tenderness.   Abdominal:      General: Bowel sounds are normal. There is no distension.      Palpations: Abdomen is soft.      Tenderness: There is no abdominal tenderness.   Musculoskeletal:         General: No tenderness or deformity. Normal range of motion.      Cervical back: Normal range of motion and neck supple.      Right lower leg: No edema.      Left lower leg: No edema.   Skin:     General: Skin is warm and dry.      Findings: No erythema or rash.   Neurological:      Mental Status: She is alert and oriented to person, place, and time.      Cranial Nerves: No cranial nerve deficit.      Motor: No abnormal muscle tone.      Coordination: Coordination normal.   Psychiatric:         Behavior: Behavior normal.         Thought Content: Thought content normal.         Judgment: Judgment normal.           No results found for: "CHOL"  No results found for: "HDL"  No results found for: "LDLCALC"  Lab Results   Component Value Date    ALT 14 09/21/2023    AST 20 09/21/2023     Lab Results   Component Value Date    CREATININE 0.7 09/21/2023    BUN 13 09/21/2023     09/21/2023    K 3.8 09/21/2023    CO2 26 09/21/2023     Lab Results   Component Value Date    HGB 14.7 09/21/2023    HCT 45.9 " 09/21/2023                         Assessment and Plan:     Precordial chest pain       Patient currently asymptomatic from the cardiac standpoint.  She has very little in the way of cardiac risk factors.  Hypercholesterolemia is noted in the history, I have no laboratory to review.  She is generally seen at [facility] and those records are not unavailable for review.  The patient feels comfortable at this point without undergoing further cardiac evaluation.  She will let me know if she would have further problems with chest pains or tightness.      Cardiac risk assessment would be performed with her general medical care [facility].    Return to clinic as needed.  No follow-ups on file.          No future appointments.